# Patient Record
Sex: MALE | Race: WHITE | Employment: OTHER | URBAN - METROPOLITAN AREA
[De-identification: names, ages, dates, MRNs, and addresses within clinical notes are randomized per-mention and may not be internally consistent; named-entity substitution may affect disease eponyms.]

---

## 2019-12-19 ENCOUNTER — APPOINTMENT (OUTPATIENT)
Dept: GENERAL RADIOLOGY | Age: 79
End: 2019-12-19
Attending: STUDENT IN AN ORGANIZED HEALTH CARE EDUCATION/TRAINING PROGRAM
Payer: MEDICARE

## 2019-12-19 ENCOUNTER — HOSPITAL ENCOUNTER (OUTPATIENT)
Age: 79
Setting detail: OBSERVATION
Discharge: HOME | End: 2019-12-20
Attending: STUDENT IN AN ORGANIZED HEALTH CARE EDUCATION/TRAINING PROGRAM | Admitting: INTERNAL MEDICINE
Payer: MEDICARE

## 2019-12-19 DIAGNOSIS — R07.9 CHEST PAIN, UNSPECIFIED TYPE: Primary | ICD-10-CM

## 2019-12-19 LAB
ALBUMIN SERPL-MCNC: 3.5 G/DL (ref 3.2–4.6)
ALBUMIN/GLOB SERPL: 1.1 {RATIO} (ref 1.2–3.5)
ALP SERPL-CCNC: 85 U/L (ref 50–136)
ALT SERPL-CCNC: 23 U/L (ref 12–65)
ANION GAP SERPL CALC-SCNC: 5 MMOL/L (ref 7–16)
AST SERPL-CCNC: 29 U/L (ref 15–37)
ATRIAL RATE: 77 BPM
BASOPHILS # BLD: 0.1 K/UL (ref 0–0.2)
BASOPHILS NFR BLD: 1 % (ref 0–2)
BILIRUB SERPL-MCNC: 0.3 MG/DL (ref 0.2–1.1)
BUN SERPL-MCNC: 17 MG/DL (ref 8–23)
CALCIUM SERPL-MCNC: 8.4 MG/DL (ref 8.3–10.4)
CALCULATED P AXIS, ECG09: 94 DEGREES
CALCULATED R AXIS, ECG10: 12 DEGREES
CALCULATED T AXIS, ECG11: 57 DEGREES
CHLORIDE SERPL-SCNC: 111 MMOL/L (ref 98–107)
CO2 SERPL-SCNC: 30 MMOL/L (ref 21–32)
CREAT SERPL-MCNC: 1.26 MG/DL (ref 0.8–1.5)
DIAGNOSIS, 93000: NORMAL
DIFFERENTIAL METHOD BLD: ABNORMAL
EOSINOPHIL # BLD: 0.3 K/UL (ref 0–0.8)
EOSINOPHIL NFR BLD: 3 % (ref 0.5–7.8)
ERYTHROCYTE [DISTWIDTH] IN BLOOD BY AUTOMATED COUNT: 13.7 % (ref 11.9–14.6)
GLOBULIN SER CALC-MCNC: 3.2 G/DL (ref 2.3–3.5)
GLUCOSE SERPL-MCNC: 82 MG/DL (ref 65–100)
HCT VFR BLD AUTO: 44.2 % (ref 41.1–50.3)
HGB BLD-MCNC: 14.3 G/DL (ref 13.6–17.2)
IMM GRANULOCYTES # BLD AUTO: 0 K/UL (ref 0–0.5)
IMM GRANULOCYTES NFR BLD AUTO: 0 % (ref 0–5)
LIPASE SERPL-CCNC: 175 U/L (ref 73–393)
LYMPHOCYTES # BLD: 1.2 K/UL (ref 0.5–4.6)
LYMPHOCYTES NFR BLD: 15 % (ref 13–44)
MCH RBC QN AUTO: 29.1 PG (ref 26.1–32.9)
MCHC RBC AUTO-ENTMCNC: 32.4 G/DL (ref 31.4–35)
MCV RBC AUTO: 90 FL (ref 79.6–97.8)
MONOCYTES # BLD: 0.5 K/UL (ref 0.1–1.3)
MONOCYTES NFR BLD: 6 % (ref 4–12)
NEUTS SEG # BLD: 6.3 K/UL (ref 1.7–8.2)
NEUTS SEG NFR BLD: 75 % (ref 43–78)
NRBC # BLD: 0 K/UL (ref 0–0.2)
P-R INTERVAL, ECG05: 234 MS
PLATELET # BLD AUTO: 492 K/UL (ref 150–450)
PMV BLD AUTO: 9.7 FL (ref 9.4–12.3)
POTASSIUM SERPL-SCNC: 4 MMOL/L (ref 3.5–5.1)
PROT SERPL-MCNC: 6.7 G/DL (ref 6.3–8.2)
Q-T INTERVAL, ECG07: 354 MS
QRS DURATION, ECG06: 76 MS
QTC CALCULATION (BEZET), ECG08: 400 MS
RBC # BLD AUTO: 4.91 M/UL (ref 4.23–5.6)
SODIUM SERPL-SCNC: 146 MMOL/L (ref 136–145)
TROPONIN I SERPL-MCNC: <0.02 NG/ML (ref 0.02–0.05)
TROPONIN I SERPL-MCNC: <0.02 NG/ML (ref 0.02–0.05)
VENTRICULAR RATE, ECG03: 77 BPM
WBC # BLD AUTO: 8.5 K/UL (ref 4.3–11.1)

## 2019-12-19 PROCEDURE — 99218 HC RM OBSERVATION: CPT

## 2019-12-19 PROCEDURE — 71046 X-RAY EXAM CHEST 2 VIEWS: CPT

## 2019-12-19 PROCEDURE — 84484 ASSAY OF TROPONIN QUANT: CPT

## 2019-12-19 PROCEDURE — 85025 COMPLETE CBC W/AUTO DIFF WBC: CPT

## 2019-12-19 PROCEDURE — 99285 EMERGENCY DEPT VISIT HI MDM: CPT | Performed by: STUDENT IN AN ORGANIZED HEALTH CARE EDUCATION/TRAINING PROGRAM

## 2019-12-19 PROCEDURE — 93005 ELECTROCARDIOGRAM TRACING: CPT | Performed by: STUDENT IN AN ORGANIZED HEALTH CARE EDUCATION/TRAINING PROGRAM

## 2019-12-19 PROCEDURE — 83690 ASSAY OF LIPASE: CPT

## 2019-12-19 PROCEDURE — 36415 COLL VENOUS BLD VENIPUNCTURE: CPT

## 2019-12-19 PROCEDURE — 80053 COMPREHEN METABOLIC PANEL: CPT

## 2019-12-19 RX ORDER — MEMANTINE HYDROCHLORIDE 10 MG/1
10 TABLET ORAL DAILY
COMMUNITY
End: 2020-11-17

## 2019-12-19 RX ORDER — GUAIFENESIN 100 MG/5ML
324 LIQUID (ML) ORAL
Status: DISCONTINUED | OUTPATIENT
Start: 2019-12-19 | End: 2019-12-19

## 2019-12-19 NOTE — ED NOTES
Patient has returned from radiology at this time. Cardiac monitoring and cycling vitals in place. Family at bedside.

## 2019-12-19 NOTE — ED PROVIDER NOTES
70-year-old male patient presents with reports of sudden onset left-sided chest pain. Patient states pain started as walking his dog. Family at bedside estimates approximately 30 minutes of symptoms prior to spontaneous resolution prior to my evaluation. Patient reports some mild shortness of breath. He did report some nausea but denies vomiting. No diaphoresis mention. Patient denies any radiation of symptoms per report. History is limited secondary to patient's history of dementia. Currently, patient has experienced similar episodes though less severe intermittently over the past 2 weeks.            Past Medical History:   Diagnosis Date    Hematuria, microscopic     Memory loss     PUD (peptic ulcer disease)     no recent episodes       Past Surgical History:   Procedure Laterality Date    APPENDECTOMY      HX HERNIA REPAIR           Family History:   Problem Relation Age of Onset    Cancer Mother     Lung Disease Sister     Cancer Brother     Alcohol abuse Brother     Alcohol abuse Brother     Alcohol abuse Brother        Social History     Socioeconomic History    Marital status:      Spouse name: Not on file    Number of children: Not on file    Years of education: Not on file    Highest education level: Not on file   Occupational History    Not on file   Social Needs    Financial resource strain: Not on file    Food insecurity:     Worry: Not on file     Inability: Not on file    Transportation needs:     Medical: Not on file     Non-medical: Not on file   Tobacco Use    Smoking status: Never Smoker    Smokeless tobacco: Never Used   Substance and Sexual Activity    Alcohol use: Yes     Comment: no alcohol in 7 months    Drug use: Not on file    Sexual activity: Not on file   Lifestyle    Physical activity:     Days per week: Not on file     Minutes per session: Not on file    Stress: Not on file   Relationships    Social connections:     Talks on phone: Not on file Gets together: Not on file     Attends [employer] service: Not on file     Active member of club or organization: Not on file     Attends meetings of clubs or organizations: Not on file     Relationship status: Not on file    Intimate partner violence:     Fear of current or ex partner: Not on file     Emotionally abused: Not on file     Physically abused: Not on file     Forced sexual activity: Not on file   Other Topics Concern    Not on file   Social History Narrative    Not on file         ALLERGIES: Patient has no known allergies. Review of Systems   Constitutional: Negative for chills, diaphoresis and fever. HENT: Negative for congestion, sneezing and sore throat. Eyes: Negative for visual disturbance. Respiratory: Negative for cough, chest tightness, shortness of breath and wheezing. Cardiovascular: Positive for chest pain. Negative for leg swelling. Gastrointestinal: Negative for abdominal pain, blood in stool, diarrhea, nausea and vomiting. Endocrine: Negative for polyuria. Genitourinary: Negative for difficulty urinating, dysuria, flank pain, hematuria and urgency. Musculoskeletal: Negative for back pain, myalgias, neck pain and neck stiffness. Skin: Negative for color change and rash. Neurological: Negative for dizziness, syncope, speech difficulty, weakness, light-headedness, numbness and headaches. Psychiatric/Behavioral: Negative for behavioral problems. All other systems reviewed and are negative. Vitals:    12/19/19 1707   BP: 135/66   Pulse: 79   Resp: 16   Temp: 98.1 °F (36.7 °C)   SpO2: 96%   Weight: 61.2 kg (135 lb)   Height: 5' 7\" (1.702 m)            Physical Exam  Vitals signs and nursing note reviewed. Constitutional:       General: He is not in acute distress. Appearance: He is well-developed. He is not diaphoretic. Comments: Elderly appearing male patient, alert and oriented to person place and time.   No acute distress, speaks in clear, fluid sentences. HENT:      Head: Normocephalic and atraumatic. Right Ear: External ear normal.      Left Ear: External ear normal.      Nose: Nose normal.   Eyes:      Pupils: Pupils are equal, round, and reactive to light. Neck:      Musculoskeletal: Normal range of motion. Cardiovascular:      Rate and Rhythm: Normal rate and regular rhythm. Heart sounds: Normal heart sounds. No murmur. No friction rub. No gallop. Pulmonary:      Effort: Pulmonary effort is normal. No respiratory distress. Breath sounds: Normal breath sounds. No stridor. No decreased breath sounds, wheezing, rhonchi or rales. Chest:      Chest wall: No tenderness. Abdominal:      General: There is no distension. Palpations: Abdomen is soft. There is no mass. Tenderness: There is no tenderness. There is no guarding or rebound. Hernia: No hernia is present. Musculoskeletal: Normal range of motion. General: No tenderness or deformity. Skin:     General: Skin is warm and dry. Neurological:      Mental Status: He is alert and oriented to person, place, and time. Cranial Nerves: No cranial nerve deficit. MDM  Number of Diagnoses or Management Options  Diagnosis management comments: EKG obtained on arrival shows normal sinus rhythm with a rate of 77 beats a minute. No evidence of acute ischemic change.   Denies pain at present 5:24 PM         Amount and/or Complexity of Data Reviewed  Clinical lab tests: ordered and reviewed  Tests in the radiology section of CPT®: ordered and reviewed  Tests in the medicine section of CPT®: ordered and reviewed  Discuss the patient with other providers: yes  Independent visualization of images, tracings, or specimens: yes    Risk of Complications, Morbidity, and/or Mortality  Presenting problems: moderate  Diagnostic procedures: low  Management options: moderate    Patient Progress  Patient progress: stable         Procedures

## 2019-12-19 NOTE — ED TRIAGE NOTES
Patient arrives via [facility] from home. Patient reports sudden onset of chest pain while out walking. Pain midsternal, almost epigastric. Reported to family started in shoulder and radiated to abdomen. Patient took 324 asa and rested with relief of chest pain. Patient reports 2 episodes of vomiting.

## 2019-12-20 VITALS
WEIGHT: 124.9 LBS | TEMPERATURE: 97.2 F | HEART RATE: 75 BPM | BODY MASS INDEX: 19.6 KG/M2 | RESPIRATION RATE: 18 BRPM | DIASTOLIC BLOOD PRESSURE: 54 MMHG | OXYGEN SATURATION: 97 % | HEIGHT: 67 IN | SYSTOLIC BLOOD PRESSURE: 124 MMHG

## 2019-12-20 LAB
ATRIAL RATE: 51 BPM
CALCULATED P AXIS, ECG09: -8 DEGREES
CALCULATED R AXIS, ECG10: 68 DEGREES
CALCULATED T AXIS, ECG11: 92 DEGREES
DIAGNOSIS, 93000: NORMAL
P-R INTERVAL, ECG05: 180 MS
Q-T INTERVAL, ECG07: 404 MS
QRS DURATION, ECG06: 86 MS
QTC CALCULATION (BEZET), ECG08: 372 MS
VENTRICULAR RATE, ECG03: 51 BPM

## 2019-12-20 PROCEDURE — 93005 ELECTROCARDIOGRAM TRACING: CPT | Performed by: INTERNAL MEDICINE

## 2019-12-20 PROCEDURE — 90471 IMMUNIZATION ADMIN: CPT

## 2019-12-20 PROCEDURE — 74011250636 HC RX REV CODE- 250/636: Performed by: INTERNAL MEDICINE

## 2019-12-20 PROCEDURE — 99218 HC RM OBSERVATION: CPT

## 2019-12-20 PROCEDURE — 90686 IIV4 VACC NO PRSV 0.5 ML IM: CPT | Performed by: INTERNAL MEDICINE

## 2019-12-20 RX ADMIN — INFLUENZA VIRUS VACCINE 0.5 ML: 15; 15; 15; 15 SUSPENSION INTRAMUSCULAR at 10:28

## 2019-12-20 NOTE — DISCHARGE INSTRUCTIONS
Patient Education        Heart-Healthy Diet: Care Instructions  Your Care Instructions    A heart-healthy diet has lots of vegetables, fruits, nuts, beans, and whole grains, and is low in salt. It limits foods that are high in saturated fat, such as meats, cheeses, and fried foods. It may be hard to change your diet, but even small changes can lower your risk of heart attack and heart disease. Follow-up care is a key part of your treatment and safety. Be sure to make and go to all appointments, and call your doctor if you are having problems. It's also a good idea to know your test results and keep a list of the medicines you take. How can you care for yourself at home? Watch your portions  · Learn what a serving is. A \"serving\" and a \"portion\" are not always the same thing. Make sure that you are not eating larger portions than are recommended. For example, a serving of pasta is ½ cup. A serving size of meat is 2 to 3 ounces. A 3-ounce serving is about the size of a deck of cards. Measure serving sizes until you are good at [place]" them. Keep in mind that restaurants often serve portions that are 2 or 3 times the size of one serving. · To keep your energy level up and keep you from feeling hungry, eat often but in smaller portions. · Eat only the number of calories you need to stay at a healthy weight. If you need to lose weight, eat fewer calories than your body burns (through exercise and other physical activity). Eat more fruits and vegetables  · Eat a variety of fruit and vegetables every day. Dark green, deep orange, red, or yellow fruits and vegetables are especially good for you. Examples include spinach, carrots, peaches, and berries. · Keep carrots, celery, and other veggies handy for snacks. Buy fruit that is in season and store it where you can see it so that you will be tempted to eat it. · Cook dishes that have a lot of veggies in them, such as stir-fries and soups.   Limit saturated and trans fat  · Read food labels, and try to avoid saturated and trans fats. They increase your risk of heart disease. Trans fat is found in many processed foods such as cookies and crackers. · Use olive or canola oil when you cook. Try cholesterol-lowering spreads, such as Benecol or Take Control. · Bake, broil, grill, or steam foods instead of frying them. · Choose lean meats instead of high-fat meats such as hot dogs and sausages. Cut off all visible fat when you prepare meat. · Eat fish, skinless poultry, and meat alternatives such as soy products instead of high-fat meats. Soy products, such as tofu, may be especially good for your heart. · Choose low-fat or fat-free milk and dairy products. Eat fish  · Eat at least two servings of fish a week. Certain fish, such as salmon and tuna, contain omega-3 fatty acids, which may help reduce your risk of heart attack. Eat foods high in fiber  · Eat a variety of grain products every day. Include whole-grain foods that have lots of fiber and nutrients. Examples of whole-grain foods include oats, whole wheat bread, and brown rice. · Buy whole-grain breads and cereals, instead of white bread or pastries. Limit salt and sodium  · Limit how much salt and sodium you eat to help lower your blood pressure. · Taste food before you salt it. Add only a little salt when you think you need it. With time, your taste buds will adjust to less salt. · Eat fewer snack items, fast foods, and other high-salt, processed foods. Check food labels for the amount of sodium in packaged foods. · Choose low-sodium versions of canned goods (such as soups, vegetables, and beans). Limit sugar  · Limit drinks and foods with added sugar. These include candy, desserts, and soda pop. Limit alcohol  · Limit alcohol to no more than 2 drinks a day for men and 1 drink a day for women. Too much alcohol can cause health problems. When should you call for help?   Watch closely for changes in your health, and be sure to contact your doctor if:    · You would like help planning heart-healthy meals. Where can you learn more? Go to http://waldemar-daljit.info/. Enter V137 in the search box to learn more about \"Heart-Healthy Diet: Care Instructions. \"  Current as of: April 9, 2019  Content Version: 12.2  © 5695-4742 [employer]. Care instructions adapted under license by [employer] (which disclaims liability or warranty for this information). If you have questions about a medical condition or this instruction, always ask your healthcare professional. [place] [address] any warranty or liability for your use of this information.

## 2019-12-20 NOTE — PROGRESS NOTES
Chart screened by  for discharge planning. No needs identified at this time. Please consult  if any new issues arise.     Care Management Interventions  Transition of Care Consult (CM Consult): Discharge Planning  MyChart Signup: No  Discharge Durable Medical Equipment: No  Physical Therapy Consult: No  Occupational Therapy Consult: No  Speech Therapy Consult: No  Discharge Location  Discharge Placement: Home

## 2019-12-20 NOTE — ED NOTES
TRANSFER - OUT REPORT:    Verbal report given to Russell Angeles RN (name) on Alberto Echols  being transferred to 330(unit) for routine progression of care       Report consisted of patients Situation, Background, Assessment and   Recommendations(SBAR). Information from the following report(s) SBAR, ED Summary, [facility] and Recent Results was reviewed with the receiving nurse. Lines:   Peripheral IV 12/19/19 Left Wrist (Active)   Site Assessment Clean, dry, & intact 12/19/2019  5:08 PM   Phlebitis Assessment 0 12/19/2019  5:08 PM   Infiltration Assessment 0 12/19/2019  5:08 PM   Dressing Status Clean, dry, & intact 12/19/2019  5:08 PM   Dressing Type 4 X 4;Transparent 12/19/2019  5:08 PM   Hub Color/Line Status Green 12/19/2019  5:08 PM        Opportunity for questions and clarification was provided.       Patient transported with:   Registered Nurse

## 2019-12-20 NOTE — H&P
[address] [place] [address]  HISTORY AND PHYSICAL    Name:  Edenilson Matthew  MR#:  [id number]  :  1940  ACCOUNT #:  [de-identified]  ADMIT DATE:  2019    HISTORY:  This is a 75-year-old gentleman who we are asked to observe in the hospital by the emergency room physician for chest pain. He had an episode of chest discomfort and abdominal discomfort this afternoon. It could not be further characterized because the patient has dementia. Apparently at his independent living facility, he did not feel well. He did call one of his daughters who came to see him. He was transported to the emergency room. He has had no further chest discomfort. He has no known prior heart history. His past medical history is remarkable for dementia. He lives independently in a supportive community. This episode of chest discomfort occurred while walking a dog. PAST MEDICAL HISTORY:  Otherwise remarkable for a lower esophageal stricture and evidence of aspiration. MEDICATIONS:  At home are listed as [place]. FAMILY HISTORY:  Noncontributory. SOCIAL HISTORY:  Otherwise negative. ALLERGIES:  THERE ARE NO KNOWN ALLERGIES. REVIEW OF SYSTEMS:  Is performed. It is limited due to his dementia. He denies any fever or chills, diplopia, tinnitus, epistaxis, dysphagia, abdominal pain or melena, or signs and symptoms of stroke. PHYSICAL EXAMINATION:  VITAL SIGNS:  His blood pressure is 102/73, pulse is 67. GENERAL:  This is a well-developed, well-nourished pleasant gentleman in no distress. HEENT:  Grossly negative. There is no jaundice or drainage. NECK:  His neck veins are flat. Carotids are negative. Thyroid is not enlarged. LUNGS:  Clear. HEART:  Reveals a regular rate and rhythm. There is no murmur, gallop or rub. ABDOMEN:  Soft. There are no masses. No tenderness. EXTREMITIES:  Pulses are intact. There is no edema. NEUROLOGICAL:  Remarkable for his dementia.   He cannot remember any of the events from today. His electrocardiogram shows a normal sinus rhythm. 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 His chest x-ray suggests COPD. His lab work is negative except for creatinine 1.2. His initial troponin is less than 0.02. IMPRESSION:  1. Status post episode of chest and abdominal pain that is unable to be further characterized due to the patient's dementia. 2.  Dementia. 3.  Esophageal stricture. 4.  History of aspiration. 5.  Chronic obstructive pulmonary disease on chest x-ray. 6.  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 PLAN:  He will be observed overnight. An additional troponin will be ordered. No further testing is anticipated. It is hoped for an early discharge for him to be able to return back to his usual situation in his independent living home.       Gissel Corona MD      [id number]  D:  12/19/2019 21:29  T:  12/19/2019 23:50  JOB #:  [id number]

## 2019-12-20 NOTE — H&P
Pt seen and examined. Imp:  S/p episode of poorly characterized chest +/or abdominal pain  Dementia  Abnormal Ekg  Hx esophogeal stricture  Hx aspiration  Plan:  Observe overnight. If second troponin negative, discharge early.

## 2019-12-20 NOTE — PROGRESS NOTES
Patient arrived to the floor via stretcher to room 330 . Patient oriented to the room and use of the call light. Assessment completed. Telemetry box placed and verified with monitor tech. Call light within reach. Dual admission skin assessment is as follows: skin is warm, dry, and intact. Heels dry and intact. No skin breakdown noted. Sacrum intact and blanchable.

## 2019-12-20 NOTE — MED STUDENT NOTES
12/20/2019 8:08 AM 
 
Admit Date: 12/19/2019 Admit Diagnosis: Chest pain [R07.9] Subjective:  
 Patient is 77 yo male with a history of chest pain, esophageal stricture, htn, alzheimers. He was admitted from the ER with chest pain for observation. Troponins returned normal.  
 
12/20 - no chest pain or sob. Objective:  
  
Visit Vitals /79 (BP 1 Location: Right arm, BP Patient Position: At rest) Pulse 63 Temp 98.3 °F (36.8 °C) Resp 18 Ht 5' 7\" (1.702 m) Wt 124 lb 14.4 oz (56.7 kg) SpO2 97% BMI 19.56 kg/m² ROS:  General ROS: negative for - chills Hematological and Lymphatic ROS: negative for - blood clots or jaundice Respiratory ROS: no cough, shortness of breath, or wheezing Cardiovascular ROS: no chest pain or dyspnea on exertion Gastrointestinal ROS: no abdominal pain, change in bowel habits, or black or bloody stools Neurological ROS: no TIA or stroke symptoms Physical Exam: 
 
Physical Examination: General appearance - alert, well appearing, and in no distress Mental status - alert, oriented to person, place, and time Eyes - pupils equal and reactive, extraocular eye movements intact Neck/lymph - supple, no significant adenopathy Chest/CV - clear to auscultation, no wheezes, rales or rhonchi, symmetric air entry Heart - normal rate, regular rhythm, normal S1, S2, no murmurs, rubs, clicks or gallops Abdomen/GI - soft, nontender, nondistended, no masses or organomegaly Musculoskeletal - no joint tenderness, deformity or swelling Extremities - peripheral pulses normal, no pedal edema, no clubbing or cyanosis Skin - normal coloration and turgor, no rashes, no suspicious skin lesions noted Current Facility-Administered Medications Medication Dose Route Frequency  influenza vaccine 2019-20 (6 mos+)(PF) (FLUARIX/FLULAVAL/FLUZONE QUAD) injection 0.5 mL  0.5 mL IntraMUSCular PRIOR TO DISCHARGE Data Review: @LABRCNT(Na,K,BUN,CREA,WBC,HGB,HCT,PLT,INR,TRP,TCHOL*,Triglyceride*,LDL*,LDLCPOC HDL*,HDL])@ TELEMETRY: observation Assessment/Plan: Active Problems: 
  Chest pain (12/19/2019) - Troponins normal, symptoms resolved - Discharge home today Padmaja Florez

## 2019-12-20 NOTE — PROGRESS NOTES
Problem: Falls - Risk of  Goal: *Absence of Falls  Description  Document [place] Fall Risk and appropriate interventions in the flowsheet.   Outcome: Progressing Towards Goal  Note: Fall Risk Interventions:       Mentation Interventions: Bed/chair exit alarm, Door open when patient unattended, Increase mobility, More frequent rounding, Reorient patient    Medication Interventions: Bed/chair exit alarm, Evaluate medications/consider consulting pharmacy, Patient to call before getting OOB, Teach patient to arise slowly

## 2019-12-20 NOTE — PROGRESS NOTES
Discharge instructions were reviewed with patient. An opportunity was given for questions. All medications were reviewed, and information was given on the new medications. Patient verbalized understanding, and has no questions at this time.    Iv and heart monitor removed

## 2019-12-20 NOTE — HOME CARE
[facility] Cardiology Discharge Summary     Patient ID:  Catina Hall  [id number]  70 y.o.  1940    Admit date: 12/19/2019    Discharge date:  12/20/2019    Admitting Physician: Radha Brooks MD     Discharge Physician: Baron Jacobson NP/Dr. GI [facility]ERE[facility]    Admission Diagnoses: Chest pain [R07.9]    Discharge Diagnoses:   Patient Active Problem List    Diagnosis Date Noted    Chest pain 12/19/2019       Cardiology Procedures this admission:  None  Consults: None    Hospital Course: Patient presented to the ER with c/o chest pain and abdominal pain while walking his dog. Description of the pain was difficult to obtain due to the patient's dementia. Troponin level was negative x 2. EKG showed SR with no acute ST changes. No further chest pain overnight. The patient will follow up with [facility] Cardiology -- Dr. Garfield Arteaga on 1/6/19 @ 8 am to discuss if further risk stratification is warranted. .     DISPOSITION: The patient is being discharged home in stable condition on a low saturated fat, low cholesterol and low salt diet. The patient is instructed to advance activities as tolerated to the limit of fatigue or shortness of breath. . The patient is instructed to call the office or return to the ER for immediate evaluation for any shortness of breath or chest pain not relieved by NTG.         Discharge Exam:   Visit Vitals  /54 (BP 1 Location: Left arm, BP Patient Position: At rest)   Pulse 75   Temp 97.2 °F (36.2 °C)   Resp 18   Ht 5' 7\" (1.702 m)   Wt 124 lb 14.4 oz (56.7 kg)   SpO2 97%   BMI 19.56 kg/m²         Recent Results (from the past 24 hour(s))   EKG, 12 LEAD, INITIAL    Collection Time: 12/19/19  5:09 PM   Result Value Ref Range    Ventricular Rate 77 BPM    Atrial Rate 77 BPM    P-R Interval 234 ms    QRS Duration 76 ms    Q-T Interval 354 ms    QTC Calculation (Bezet) 400 ms    Calculated P Axis 94 degrees    Calculated R Axis 12 degrees    Calculated T Axis 57 degrees Diagnosis       !! AGE AND GENDER SPECIFIC ECG ANALYSIS !! Sinus rhythm with 1st degree A-V block  Inferior infarct , age undetermined  Abnormal ECG  No previous ECGs available  Confirmed by JOSE R BECKFORD (), Ines Potter ([id number]) on 12/19/2019 5:36:14 PM     CBC WITH AUTOMATED DIFF    Collection Time: 12/19/19  5:18 PM   Result Value Ref Range    WBC 8.5 4.3 - 11.1 K/uL    RBC 4.91 4.23 - 5.6 M/uL    HGB 14.3 13.6 - 17.2 g/dL    HCT 44.2 41.1 - 50.3 %    MCV 90.0 79.6 - 97.8 FL    MCH 29.1 26.1 - 32.9 PG    MCHC 32.4 31.4 - 35.0 g/dL    RDW 13.7 11.9 - 14.6 %    PLATELET 241 (H) 959 - 450 K/uL    MPV 9.7 9.4 - 12.3 FL    ABSOLUTE NRBC 0.00 0.0 - 0.2 K/uL    DF AUTOMATED      NEUTROPHILS 75 43 - 78 %    LYMPHOCYTES 15 13 - 44 %    MONOCYTES 6 4.0 - 12.0 %    EOSINOPHILS 3 0.5 - 7.8 %    BASOPHILS 1 0.0 - 2.0 %    IMMATURE GRANULOCYTES 0 0.0 - 5.0 %    ABS. NEUTROPHILS 6.3 1.7 - 8.2 K/UL    ABS. LYMPHOCYTES 1.2 0.5 - 4.6 K/UL    ABS. MONOCYTES 0.5 0.1 - 1.3 K/UL    ABS. EOSINOPHILS 0.3 0.0 - 0.8 K/UL    ABS. BASOPHILS 0.1 0.0 - 0.2 K/UL    ABS. IMM. GRANS. 0.0 0.0 - 0.5 K/UL   METABOLIC PANEL, COMPREHENSIVE    Collection Time: 12/19/19  5:18 PM   Result Value Ref Range    Sodium 146 (H) 136 - 145 mmol/L    Potassium 4.0 3.5 - 5.1 mmol/L    Chloride 111 (H) 98 - 107 mmol/L    CO2 30 21 - 32 mmol/L    Anion gap 5 (L) 7 - 16 mmol/L    Glucose 82 65 - 100 mg/dL    BUN 17 8 - 23 MG/DL    Creatinine 1.26 0.8 - 1.5 MG/DL    GFR est AA >60 >60 ml/min/1.73m2    GFR est non-AA 59 (L) >60 ml/min/1.73m2    Calcium 8.4 8.3 - 10.4 MG/DL    Bilirubin, total 0.3 0.2 - 1.1 MG/DL    ALT (SGPT) 23 12 - 65 U/L    AST (SGOT) 29 15 - 37 U/L    Alk.  phosphatase 85 50 - 136 U/L    Protein, total 6.7 6.3 - 8.2 g/dL    Albumin 3.5 3.2 - 4.6 g/dL    Globulin 3.2 2.3 - 3.5 g/dL    A-G Ratio 1.1 (L) 1.2 - 3.5     TROPONIN I    Collection Time: 12/19/19  5:18 PM   Result Value Ref Range    Troponin-I, Qt. <0.02 (L) 0.02 - 0.05 NG/ML   LIPASE Collection Time: 12/19/19  5:18 PM   Result Value Ref Range    Lipase 175 73 - 393 U/L   TROPONIN I    Collection Time: 12/19/19 10:12 PM   Result Value Ref Range    Troponin-I, Qt. <0.02 (L) 0.02 - 0.05 NG/ML   EKG, 12 LEAD, SUBSEQUENT    Collection Time: 12/20/19  6:39 AM   Result Value Ref Range    Ventricular Rate 51 BPM    Atrial Rate 51 BPM    P-R Interval 180 ms    QRS Duration 86 ms    Q-T Interval 404 ms    QTC Calculation (Bezet) 372 ms    Calculated P Axis -8 degrees    Calculated R Axis 68 degrees    Calculated T Axis 92 degrees    Diagnosis       Sinus bradycardia  Nonspecific ST abnormality  Abnormal ECG  When compared with ECG of 19-DEC-2019 17:09,  LA interval has decreased  Vent. rate has decreased BY  26 BPM  Criteria for Inferior infarct are no longer Present  T wave inversion now evident in Anterior leads           Patient Instructions:     Current Discharge Medication List      CONTINUE these medications which have NOT CHANGED    Details   memantine (NAMENDA) 10 mg tablet Take 10 mg by mouth daily.              Signed:  Julian Westbrook NP  12/20/2019  9:20 AM

## 2020-11-16 ENCOUNTER — APPOINTMENT (OUTPATIENT)
Dept: GENERAL RADIOLOGY | Age: 80
DRG: 177 | End: 2020-11-16
Attending: EMERGENCY MEDICINE
Payer: MEDICARE

## 2020-11-16 ENCOUNTER — HOSPITAL ENCOUNTER (INPATIENT)
Age: 80
LOS: 4 days | Discharge: HOME | DRG: 177 | End: 2020-11-21
Attending: EMERGENCY MEDICINE | Admitting: INTERNAL MEDICINE
Payer: MEDICARE

## 2020-11-16 DIAGNOSIS — U07.1 PNEUMONIA DUE TO 2019 NOVEL CORONAVIRUS: Primary | ICD-10-CM

## 2020-11-16 DIAGNOSIS — J12.82 PNEUMONIA DUE TO 2019 NOVEL CORONAVIRUS: Primary | ICD-10-CM

## 2020-11-16 DIAGNOSIS — R09.02 HYPOXEMIA: ICD-10-CM

## 2020-11-16 LAB
ALBUMIN SERPL-MCNC: 2.8 G/DL (ref 3.2–4.6)
ALBUMIN/GLOB SERPL: 0.8 {RATIO} (ref 1.2–3.5)
ALP SERPL-CCNC: 53 U/L (ref 50–136)
ALT SERPL-CCNC: 7 U/L (ref 12–65)
ANION GAP SERPL CALC-SCNC: 9 MMOL/L (ref 7–16)
AST SERPL-CCNC: 31 U/L (ref 15–37)
ATRIAL RATE: 71 BPM
BASOPHILS # BLD: 0 K/UL (ref 0–0.2)
BASOPHILS NFR BLD: 0 % (ref 0–2)
BILIRUB SERPL-MCNC: 0.4 MG/DL (ref 0.2–1.1)
BUN SERPL-MCNC: 21 MG/DL (ref 8–23)
CALCIUM SERPL-MCNC: 7.5 MG/DL (ref 8.3–10.4)
CALCULATED R AXIS, ECG10: 54 DEGREES
CALCULATED T AXIS, ECG11: 72 DEGREES
CHLORIDE SERPL-SCNC: 110 MMOL/L (ref 98–107)
CO2 SERPL-SCNC: 24 MMOL/L (ref 21–32)
COVID-19 RAPID TEST, COVR: DETECTED
CREAT SERPL-MCNC: 1.25 MG/DL (ref 0.8–1.5)
DIAGNOSIS, 93000: NORMAL
DIFFERENTIAL METHOD BLD: ABNORMAL
EOSINOPHIL # BLD: 0 K/UL (ref 0–0.8)
EOSINOPHIL NFR BLD: 0 % (ref 0.5–7.8)
ERYTHROCYTE [DISTWIDTH] IN BLOOD BY AUTOMATED COUNT: 14 % (ref 11.9–14.6)
GLOBULIN SER CALC-MCNC: 3.5 G/DL (ref 2.3–3.5)
GLUCOSE SERPL-MCNC: 99 MG/DL (ref 65–100)
HCT VFR BLD AUTO: 40.8 % (ref 41.1–50.3)
HGB BLD-MCNC: 13 G/DL (ref 13.6–17.2)
IMM GRANULOCYTES # BLD AUTO: 0 K/UL (ref 0–0.5)
IMM GRANULOCYTES NFR BLD AUTO: 0 % (ref 0–5)
LACTATE SERPL-SCNC: 1 MMOL/L (ref 0.4–2)
LYMPHOCYTES # BLD: 1 K/UL (ref 0.5–4.6)
LYMPHOCYTES NFR BLD: 14 % (ref 13–44)
MCH RBC QN AUTO: 28.2 PG (ref 26.1–32.9)
MCHC RBC AUTO-ENTMCNC: 31.9 G/DL (ref 31.4–35)
MCV RBC AUTO: 88.5 FL (ref 79.6–97.8)
MONOCYTES # BLD: 0.5 K/UL (ref 0.1–1.3)
MONOCYTES NFR BLD: 7 % (ref 4–12)
NEUTS SEG # BLD: 5.6 K/UL (ref 1.7–8.2)
NEUTS SEG NFR BLD: 78 % (ref 43–78)
NRBC # BLD: 0 K/UL (ref 0–0.2)
PLATELET # BLD AUTO: 438 K/UL (ref 150–450)
PMV BLD AUTO: 9.8 FL (ref 9.4–12.3)
POTASSIUM SERPL-SCNC: 4.3 MMOL/L (ref 3.5–5.1)
PROT SERPL-MCNC: 6.3 G/DL (ref 6.3–8.2)
Q-T INTERVAL, ECG07: 374 MS
QRS DURATION, ECG06: 76 MS
QTC CALCULATION (BEZET), ECG08: 406 MS
RBC # BLD AUTO: 4.61 M/UL (ref 4.23–5.6)
SODIUM SERPL-SCNC: 143 MMOL/L (ref 136–145)
SOURCE, COVRS: ABNORMAL
TROPONIN-HIGH SENSITIVITY: 15.8 PG/ML (ref 0–14)
VENTRICULAR RATE, ECG03: 71 BPM
WBC # BLD AUTO: 7.2 K/UL (ref 4.3–11.1)

## 2020-11-16 PROCEDURE — 83605 ASSAY OF LACTIC ACID: CPT

## 2020-11-16 PROCEDURE — 84145 PROCALCITONIN (PCT): CPT

## 2020-11-16 PROCEDURE — 93005 ELECTROCARDIOGRAM TRACING: CPT | Performed by: EMERGENCY MEDICINE

## 2020-11-16 PROCEDURE — 85025 COMPLETE CBC W/AUTO DIFF WBC: CPT

## 2020-11-16 PROCEDURE — 87040 BLOOD CULTURE FOR BACTERIA: CPT

## 2020-11-16 PROCEDURE — 71045 X-RAY EXAM CHEST 1 VIEW: CPT

## 2020-11-16 PROCEDURE — 96374 THER/PROPH/DIAG INJ IV PUSH: CPT

## 2020-11-16 PROCEDURE — 84484 ASSAY OF TROPONIN QUANT: CPT

## 2020-11-16 PROCEDURE — 87635 SARS-COV-2 COVID-19 AMP PRB: CPT

## 2020-11-16 PROCEDURE — 80053 COMPREHEN METABOLIC PANEL: CPT

## 2020-11-16 PROCEDURE — 99285 EMERGENCY DEPT VISIT HI MDM: CPT

## 2020-11-16 PROCEDURE — 74011250636 HC RX REV CODE- 250/636: Performed by: EMERGENCY MEDICINE

## 2020-11-16 RX ADMIN — SODIUM CHLORIDE 1000 ML: 900 INJECTION, SOLUTION INTRAVENOUS at 21:58

## 2020-11-16 RX ADMIN — AZITHROMYCIN MONOHYDRATE 500 MG: 500 INJECTION, POWDER, LYOPHILIZED, FOR SOLUTION INTRAVENOUS at 23:02

## 2020-11-17 PROBLEM — J96.01 ACUTE RESPIRATORY FAILURE WITH HYPOXIA (HCC): Status: ACTIVE | Noted: 2020-11-17

## 2020-11-17 PROBLEM — J12.82 PNEUMONIA DUE TO COVID-19 VIRUS: Status: ACTIVE | Noted: 2020-11-17

## 2020-11-17 PROBLEM — F02.80 ALZHEIMER DISEASE (HCC): Status: ACTIVE | Noted: 2020-11-17

## 2020-11-17 PROBLEM — U07.1 PNEUMONIA DUE TO COVID-19 VIRUS: Status: ACTIVE | Noted: 2020-11-17

## 2020-11-17 PROBLEM — G30.9 ALZHEIMER DISEASE (HCC): Status: ACTIVE | Noted: 2020-11-17

## 2020-11-17 LAB
ABO + RH BLD: NORMAL
BLOOD GROUP ANTIBODIES SERPL: NORMAL
D DIMER PPP FEU-MCNC: 0.55 UG/ML(FEU)
PROCALCITONIN SERPL-MCNC: 0.11 NG/ML
SPECIMEN EXP DATE BLD: NORMAL
TROPONIN-HIGH SENSITIVITY: 14.1 PG/ML (ref 0–14)

## 2020-11-17 PROCEDURE — 74011250637 HC RX REV CODE- 250/637: Performed by: INTERNAL MEDICINE

## 2020-11-17 PROCEDURE — 74011250636 HC RX REV CODE- 250/636: Performed by: INTERNAL MEDICINE

## 2020-11-17 PROCEDURE — 74011000258 HC RX REV CODE- 258: Performed by: INTERNAL MEDICINE

## 2020-11-17 PROCEDURE — XW033E5 INTRODUCTION OF REMDESIVIR ANTI-INFECTIVE INTO PERIPHERAL VEIN, PERCUTANEOUS APPROACH, NEW TECHNOLOGY GROUP 5: ICD-10-PCS | Performed by: INTERNAL MEDICINE

## 2020-11-17 PROCEDURE — 65270000029 HC RM PRIVATE

## 2020-11-17 PROCEDURE — 94760 N-INVAS EAR/PLS OXIMETRY 1: CPT

## 2020-11-17 PROCEDURE — 74011250636 HC RX REV CODE- 250/636: Performed by: EMERGENCY MEDICINE

## 2020-11-17 PROCEDURE — 77010033678 HC OXYGEN DAILY

## 2020-11-17 PROCEDURE — 86900 BLOOD TYPING SEROLOGIC ABO: CPT

## 2020-11-17 PROCEDURE — 84484 ASSAY OF TROPONIN QUANT: CPT

## 2020-11-17 PROCEDURE — 74011000250 HC RX REV CODE- 250: Performed by: INTERNAL MEDICINE

## 2020-11-17 PROCEDURE — 85379 FIBRIN DEGRADATION QUANT: CPT

## 2020-11-17 RX ORDER — PANTOPRAZOLE SODIUM 40 MG/1
40 TABLET, DELAYED RELEASE ORAL
Status: DISCONTINUED | OUTPATIENT
Start: 2020-11-17 | End: 2020-11-21 | Stop reason: HOSPADM

## 2020-11-17 RX ORDER — ACETAMINOPHEN 325 MG/1
650 TABLET ORAL
Status: DISCONTINUED | OUTPATIENT
Start: 2020-11-17 | End: 2020-11-21 | Stop reason: HOSPADM

## 2020-11-17 RX ORDER — SODIUM CHLORIDE 9 MG/ML
250 INJECTION, SOLUTION INTRAVENOUS AS NEEDED
Status: DISCONTINUED | OUTPATIENT
Start: 2020-11-17 | End: 2020-11-21 | Stop reason: HOSPADM

## 2020-11-17 RX ORDER — DEXAMETHASONE 4 MG/1
6 TABLET ORAL DAILY
Status: DISCONTINUED | OUTPATIENT
Start: 2020-11-17 | End: 2020-11-17

## 2020-11-17 RX ORDER — MEMANTINE HYDROCHLORIDE 5 MG/1
10 TABLET ORAL
Status: DISCONTINUED | OUTPATIENT
Start: 2020-11-17 | End: 2020-11-21 | Stop reason: HOSPADM

## 2020-11-17 RX ORDER — ASCORBIC ACID 500 MG
1000 TABLET ORAL 2 TIMES DAILY
Status: DISCONTINUED | OUTPATIENT
Start: 2020-11-17 | End: 2020-11-21 | Stop reason: HOSPADM

## 2020-11-17 RX ORDER — UREA 10 %
220 LOTION (ML) TOPICAL DAILY
Status: DISCONTINUED | OUTPATIENT
Start: 2020-11-17 | End: 2020-11-21 | Stop reason: HOSPADM

## 2020-11-17 RX ORDER — DONEPEZIL HYDROCHLORIDE 5 MG/1
5 TABLET, FILM COATED ORAL
Status: DISCONTINUED | OUTPATIENT
Start: 2020-11-17 | End: 2020-11-21 | Stop reason: HOSPADM

## 2020-11-17 RX ORDER — ONDANSETRON 2 MG/ML
4 INJECTION INTRAMUSCULAR; INTRAVENOUS
Status: DISCONTINUED | OUTPATIENT
Start: 2020-11-17 | End: 2020-11-21 | Stop reason: HOSPADM

## 2020-11-17 RX ORDER — MEMANTINE HYDROCHLORIDE 10 MG/1
10 TABLET ORAL
COMMUNITY
Start: 2020-10-30

## 2020-11-17 RX ORDER — PROMETHAZINE HYDROCHLORIDE 25 MG/1
12.5 TABLET ORAL
Status: DISCONTINUED | OUTPATIENT
Start: 2020-11-17 | End: 2020-11-21 | Stop reason: HOSPADM

## 2020-11-17 RX ORDER — POLYETHYLENE GLYCOL 3350 17 G/17G
17 POWDER, FOR SOLUTION ORAL DAILY PRN
Status: DISCONTINUED | OUTPATIENT
Start: 2020-11-17 | End: 2020-11-21 | Stop reason: HOSPADM

## 2020-11-17 RX ORDER — SODIUM CHLORIDE 0.9 % (FLUSH) 0.9 %
5-40 SYRINGE (ML) INJECTION EVERY 8 HOURS
Status: DISCONTINUED | OUTPATIENT
Start: 2020-11-17 | End: 2020-11-21 | Stop reason: HOSPADM

## 2020-11-17 RX ORDER — DONEPEZIL HYDROCHLORIDE 5 MG/1
5 TABLET, FILM COATED ORAL DAILY
COMMUNITY
Start: 2020-09-08 | End: 2020-11-17

## 2020-11-17 RX ORDER — ACETAMINOPHEN 650 MG/1
650 SUPPOSITORY RECTAL
Status: DISCONTINUED | OUTPATIENT
Start: 2020-11-17 | End: 2020-11-21 | Stop reason: HOSPADM

## 2020-11-17 RX ORDER — DEXAMETHASONE 4 MG/1
6 TABLET ORAL DAILY
Status: DISCONTINUED | OUTPATIENT
Start: 2020-11-17 | End: 2020-11-21 | Stop reason: HOSPADM

## 2020-11-17 RX ORDER — ENOXAPARIN SODIUM 100 MG/ML
30 INJECTION SUBCUTANEOUS EVERY 12 HOURS
Status: DISCONTINUED | OUTPATIENT
Start: 2020-11-17 | End: 2020-11-21 | Stop reason: HOSPADM

## 2020-11-17 RX ORDER — SODIUM CHLORIDE 0.9 % (FLUSH) 0.9 %
5-40 SYRINGE (ML) INJECTION AS NEEDED
Status: DISCONTINUED | OUTPATIENT
Start: 2020-11-17 | End: 2020-11-21 | Stop reason: HOSPADM

## 2020-11-17 RX ADMIN — Medication 10 ML: at 22:00

## 2020-11-17 RX ADMIN — Medication 5 ML: at 14:00

## 2020-11-17 RX ADMIN — ENOXAPARIN SODIUM 30 MG: 40 INJECTION SUBCUTANEOUS at 08:48

## 2020-11-17 RX ADMIN — OXYCODONE HYDROCHLORIDE AND ACETAMINOPHEN 1000 MG: 500 TABLET ORAL at 17:31

## 2020-11-17 RX ADMIN — SODIUM CHLORIDE 500 ML: 900 INJECTION, SOLUTION INTRAVENOUS at 00:51

## 2020-11-17 RX ADMIN — DONEPEZIL HYDROCHLORIDE 5 MG: 5 TABLET, FILM COATED ORAL at 21:01

## 2020-11-17 RX ADMIN — Medication 220 MG: at 08:48

## 2020-11-17 RX ADMIN — MEMANTINE HYDROCHLORIDE 10 MG: 5 TABLET ORAL at 21:01

## 2020-11-17 RX ADMIN — ENOXAPARIN SODIUM 30 MG: 40 INJECTION SUBCUTANEOUS at 21:01

## 2020-11-17 RX ADMIN — REMDESIVIR 200 MG: 100 INJECTION, POWDER, LYOPHILIZED, FOR SOLUTION INTRAVENOUS at 11:56

## 2020-11-17 RX ADMIN — OXYCODONE HYDROCHLORIDE AND ACETAMINOPHEN 1000 MG: 500 TABLET ORAL at 08:48

## 2020-11-17 RX ADMIN — PANTOPRAZOLE SODIUM 40 MG: 40 TABLET, DELAYED RELEASE ORAL at 08:48

## 2020-11-17 RX ADMIN — Medication 5 ML: at 05:19

## 2020-11-17 RX ADMIN — DEXAMETHASONE 6 MG: 4 TABLET ORAL at 05:18

## 2020-11-17 NOTE — PROGRESS NOTES
11/17/20 0310   Dual Skin Pressure Injury Assessment   Dual Skin Pressure Injury Assessment WDL   Second Care Provider (Based on [address] [place] [address]) POORNIMA Muhammad   Skin Integumentary   Skin Integumentary (WDL) WDL    Pressure  Injury Documentation No Pressure Injury Noted-Pressure Ulcer Prevention Initiated

## 2020-11-17 NOTE — H&P
Hospitalist H&P Note     Admit Date:  2020  8:58 PM   Name:  Driss Shaw  Age: [de-identified] y.o.  : 1940   MRN:  [id number]   PCP:  Eugenio Dubois MD    HPI/Subjective:   Driss Shaw is a [de-identified] y.o. male with medical history significant for Alzheimer's dementia who presented from home with fever since Saturday, decreased appetite, increased confusion and hypoxia today. And is alert and oriented x3 but very hard of hearing. Provided by his daughter who was at bedside. Patient lives independently in his own apt and walks daily without any issues. He was found to have some confusions over the weekend and noted to have febrile episodes with Tmax of 102.7F. He was also noted to have decreased PO intake. Patient was taken to the Emergency MD today and to be hypotensive with blood pressure 92/50 and saturation low 90s. ED for further work-up and management. CXR at Emergency MD showed bilateral infiltrate suspicious for covid19 infection. In the ED, patient was noted to be 88% on room air with saturation improving to 97% on 2 L nasal cannula. He is afebrile with BP of 101/54. CXR shows new hazy bilateral lung infiltrates. Labs are unremarkable. 10 systems reviewed and negative except as noted in HPI.       Past Medical History:   Diagnosis Date    Hematuria, microscopic     Memory loss     PUD (peptic ulcer disease)     no recent episodes      Past Surgical History:   Procedure Laterality Date    APPENDECTOMY      HX HERNIA REPAIR        No Known Allergies   Social History     Tobacco Use    Smoking status: Never Smoker    Smokeless tobacco: Never Used   Substance Use Topics    Alcohol use: Yes     Comment: no alcohol in 7 months      Family History   Problem Relation Age of Onset    Cancer Mother     Lung Disease Sister     Cancer Brother     Alcohol abuse Brother     Alcohol abuse Brother     Alcohol abuse Brother       Immunization History   Administered Date(s) Administered    Influenza Vaccine [employer]) PF (>6 Mo Flulaval, Fluarix, and >3 Yrs Zarina Bailey [id number]) 12/20/2019     PTA Medications:  Prior to Admission Medications   Prescriptions Last Dose Informant Patient Reported? Taking?   memantine (NAMENDA) 10 mg tablet   Yes Yes   Sig: Take 10 mg by mouth nightly. Facility-Administered Medications: None       Objective:     Patient Vitals for the past 24 hrs:   Temp Pulse Resp BP SpO2   11/16/20 2250     98 %   11/16/20 2249     (!) 88 %   11/16/20 2248  61 22  98 %   11/16/20 2208  (!) 55 18  (!) 89 %   11/16/20 2205  61 28  90 %   11/16/20 2203  62 21  (!) 89 %   11/16/20 2108 98.5 °F (36.9 °C) 71 18 (!) 101/54 97 %     Oxygen Therapy  O2 Sat (%): 98 % (11/16/20 2250)  Pulse via Oximetry: 61 beats per minute (11/16/20 2248)  O2 Device: Nasal cannula (11/16/20 2250)  O2 Flow Rate (L/min): 2 l/min (11/16/20 2250)    Estimated body mass index is 19.58 kg/m² as calculated from the following:    Height as of this encounter: 5' 7\" (1.702 m). Weight as of this encounter: 56.7 kg (125 lb). No intake or output data in the 24 hours ending 11/17/20 0116    *Note that automatically entered I/Os may not be accurate; dependent on patient compliance with collection and accurate  by assistants. Physical Exam:  General:     alert, awake, no acute distress. Well nourished  Head:   normocephalic, atraumatic  Eyes, Ears, nose: PERRL, EOMI. Normal Conjunctiva. Hard of hearing  Neck:    supple, non-tender. Trachea midline. Lungs:   CTAB, no wheezing, rhonchi, rales  Cardiac:   RRR, Normal S1 and S2  Abdomen:   Soft, non distended, nontender, +BS, no guarding/rebound  Extremities:   Warm, dry. No edema   Skin:   No rashes, no jaundice  Neuro:  AAOx 4 .  No gross focal neurological deficit  Psychiatric:  No anxiety, calm, cooperative      Data Review:   Recent Results (from the past 24 hour(s))   EKG, 12 LEAD, INITIAL    Collection Time: 11/16/20 9:14 PM   Result Value Ref Range    Ventricular Rate 71 BPM    Atrial Rate 71 BPM    QRS Duration 76 ms    Q-T Interval 374 ms    QTC Calculation (Bezet) 406 ms    Calculated R Axis 54 degrees    Calculated T Axis 72 degrees    Diagnosis       Sinus rhythm  When compared with ECG of 20-DEC-2019 06:39,  Prior EKG sinus bradycardia  Confirmed by Ryley Selby MD (), LUCERO ([id number]) on 11/16/2020 10:57:37 PM     CBC WITH AUTOMATED DIFF    Collection Time: 11/16/20  9:18 PM   Result Value Ref Range    WBC 7.2 4.3 - 11.1 K/uL    RBC 4.61 4.23 - 5.6 M/uL    HGB 13.0 (L) 13.6 - 17.2 g/dL    HCT 40.8 (L) 41.1 - 50.3 %    MCV 88.5 79.6 - 97.8 FL    MCH 28.2 26.1 - 32.9 PG    MCHC 31.9 31.4 - 35.0 g/dL    RDW 14.0 11.9 - 14.6 %    PLATELET 181 297 - 406 K/uL    MPV 9.8 9.4 - 12.3 FL    ABSOLUTE NRBC 0.00 0.0 - 0.2 K/uL    DF AUTOMATED      NEUTROPHILS 78 43 - 78 %    LYMPHOCYTES 14 13 - 44 %    MONOCYTES 7 4.0 - 12.0 %    EOSINOPHILS 0 (L) 0.5 - 7.8 %    BASOPHILS 0 0.0 - 2.0 %    IMMATURE GRANULOCYTES 0 0.0 - 5.0 %    ABS. NEUTROPHILS 5.6 1.7 - 8.2 K/UL    ABS. LYMPHOCYTES 1.0 0.5 - 4.6 K/UL    ABS. MONOCYTES 0.5 0.1 - 1.3 K/UL    ABS. EOSINOPHILS 0.0 0.0 - 0.8 K/UL    ABS. BASOPHILS 0.0 0.0 - 0.2 K/UL    ABS. IMM. GRANS. 0.0 0.0 - 0.5 K/UL   METABOLIC PANEL, COMPREHENSIVE    Collection Time: 11/16/20  9:18 PM   Result Value Ref Range    Sodium 143 136 - 145 mmol/L    Potassium 4.3 3.5 - 5.1 mmol/L    Chloride 110 (H) 98 - 107 mmol/L    CO2 24 21 - 32 mmol/L    Anion gap 9 7 - 16 mmol/L    Glucose 99 65 - 100 mg/dL    BUN 21 8 - 23 MG/DL    Creatinine 1.25 0.8 - 1.5 MG/DL    GFR est AA >60 >60 ml/min/1.73m2    GFR est non-AA 59 (L) >60 ml/min/1.73m2    Calcium 7.5 (L) 8.3 - 10.4 MG/DL    Bilirubin, total 0.4 0.2 - 1.1 MG/DL    ALT (SGPT) 7 (L) 12 - 65 U/L    AST (SGOT) 31 15 - 37 U/L    Alk.  phosphatase 53 50 - 136 U/L    Protein, total 6.3 6.3 - 8.2 g/dL    Albumin 2.8 (L) 3.2 - 4.6 g/dL    Globulin 3.5 2.3 - 3.5 g/dL    A-G Ratio 0.8 (L) 1.2 - 3.5     LACTIC ACID    Collection Time: 11/16/20  9:18 PM   Result Value Ref Range    Lactic acid 1.0 0.4 - 2.0 MMOL/L   PROCALCITONIN    Collection Time: 11/16/20  9:18 PM   Result Value Ref Range    Procalcitonin 0.11 ng/mL   TROPONIN-HIGH SENSITIVITY    Collection Time: 11/16/20  9:18 PM   Result Value Ref Range    Troponin-High Sensitivity 15.8 (H) 0 - 14 pg/mL   SARS-COV-2    Collection Time: 11/16/20 10:41 PM   Result Value Ref Range    Specimen source Nasopharyngeal      COVID-19 rapid test Detected (AA) NOTD         All Micro Results     Procedure Component Value Units Date/Time    CULTURE, BLOOD [[id number]] Collected:  11/16/20 2232    Order Status:  Completed Specimen:  Blood Updated:  11/16/20 2249    CULTURE, BLOOD [[id number]] Collected:  11/16/20 2117    Order Status:  Completed Specimen:  Blood Updated:  11/16/20 2130          Current Facility-Administered Medications   Medication Dose Route Frequency    0.9% sodium chloride infusion 250 mL  250 mL IntraVENous PRN    sodium chloride (NS) flush 5-40 mL  5-40 mL IntraVENous Q8H    sodium chloride (NS) flush 5-40 mL  5-40 mL IntraVENous PRN    acetaminophen (TYLENOL) tablet 650 mg  650 mg Oral Q6H PRN    Or    acetaminophen (TYLENOL) suppository 650 mg  650 mg Rectal Q6H PRN    polyethylene glycol (MIRALAX) packet 17 g  17 g Oral DAILY PRN    promethazine (PHENERGAN) tablet 12.5 mg  12.5 mg Oral Q6H PRN    Or    ondansetron (ZOFRAN) injection 4 mg  4 mg IntraVENous Q6H PRN    enoxaparin (LOVENOX) injection 30 mg  30 mg SubCUTAneous Q12H    memantine (NAMENDA) tablet 10 mg  10 mg Oral QHS    donepeziL (ARICEPT) tablet 5 mg  5 mg Oral QHS    sodium chloride 0.9 % bolus infusion 500 mL  500 mL IntraVENous ONCE     Current Outpatient Medications   Medication Sig    memantine (NAMENDA) 10 mg tablet Take 10 mg by mouth nightly. Other Studies:  Xr Chest Port    Result Date: 11/16/2020  EXAM: Chest x-ray.  INDICATION: Dyspnea. COMPARISON: Prior chest x-ray on December 19, 2019. TECHNIQUE: Frontal view chest x-ray. FINDINGS: There are new hazy infiltrates in the mid and lower lung fields bilaterally. The cardiac size and mediastinal contour are within normal limits. No pneumothorax or pleural effusion is seen. Old rib fractures are present on the right. IMPRESSION: New hazy bilateral lung infiltrates could relate to aspiration, or atypical pneumonia such as Covid 19. Assessment:     Active Hospital Problems    Diagnosis Date Noted    Acute respiratory failure with hypoxia ([facility] 11/17/2020    Pneumonia due to COVID-19 virus 11/17/2020    Alzheimer disease ([facility] 11/17/2020       Plan:     Acute Respiratory Failure with Hypoxia  COVID 19 Pneumonia  -O2 supplement as needed and wean as toleraetd  -convalescent plasma: patient and daughter is agreeable. Risk/benefits explained. -decadron + zinc/Vitamin C  -ID for remdesivir  -lovenox for DVT PPx and follow D-Dimer. Alzheimer Disease: continue with home medication./      Diet:  DIET REGULAR  DVT PPx: lovenox  Code: Full Code  Estimated LOS:  Greater than 2 midnights    Labs/Imaging Reviewed. Risk:  HIGH risk due to current condition and comorbid conditions as well as requiring frequent monitoring and high risk of decline. Plan discussed with staff, patient/family and are in agreement. Part of this note was written by using a voice dictation software and the note has been proof read but may still contain some some grammatical/other typographical errors.      Signed:  John Leigh MD

## 2020-11-17 NOTE — PROGRESS NOTES
TRANSFER - IN REPORT:    Verbal report received from Leigh RN(name) on Aleck Koyanagi  being received from ED(unit) for routine progression of care      Report consisted of patients Situation, Background, Assessment and   Recommendations(SBAR). Information from the following report(s) SBAR, Kardex, ED Summary, [facility] and Recent Results was reviewed with the receiving nurse. Opportunity for questions and clarification was provided. Assessment completed upon patients arrival to unit and care assumed.

## 2020-11-17 NOTE — PROGRESS NOTES
Hospitalist Note     Admit Date:  2020  8:58 PM   Name:  Valerie Enriquez   Age:  [de-identified] y.o.  :  1940   MRN:  [id number]   PCP:  Lisy Corado MD  Treatment Team: Attending Provider: Flako Handley MD; Care Manager: Ernestina Oseguera RN    HPI/Subjective:   Valerie Enriquez is a [de-identified] y.o. male with medical history significant for Alzheimer's dementia who presented from home with fever since Saturday, decreased appetite, increased confusion and hypoxia. Pt seen and examined at bedside this morning. Very hard of hearing and does not always understand my questions, but denies any fevrs, chills, n/v/d, abd pain, calf pain or swelling, loss of taste or smell. He is not eatingmuch. Does admit to purulent sputum production. No other complaints  Objective:     Patient Vitals for the past 24 hrs:   Temp Pulse Resp BP SpO2   20 1125     93 %   20 0900 97.7 °F (36.5 °C) 69 16 (!) 142/66 93 %   20 0310 96.9 °F (36.1 °C) 64 16 113/68 94 %   20 0201  (!) 55 19 (!) 98/59 93 %   20 0154  69 21  94 %   20 0148  69  (!) 98/55    20 0139  (!) 58  (!) 90/48 96 %   20 0119  68 21  97 %   20 2322  (!) 59 25 (!) 109/53 94 %   20 2250     98 %   20 2249     (!) 88 %   20 2248  61 22  98 %   20 2208  (!) 55 18  (!) 89 %   20 2205  61 28  90 %   20  62 21  (!) 89 %   208 98.5 °F (36.9 °C) 71 18 (!) 101/54 97 %     Oxygen Therapy  O2 Sat (%): 93 % (20 1125)  Pulse via Oximetry: 55 beats per minute (20 0201)  O2 Device: Nasal cannula (20 112)  O2 Flow Rate (L/min): 2 l/min (20 112)    Estimated body mass index is 19.58 kg/m² as calculated from the following:    Height as of this encounter: 5' 7\" (1.702 m). Weight as of this encounter: 56.7 kg (125 lb).     No intake or output data in the 24 hours ending 20 1152    *Note that automatically entered I/Os may not be accurate; dependent on patient compliance with collection and accurate  by techs. General:    Well nourished. Alert. CV:   RRR. No murmur, rub, or gallop. Lungs:   CTAB. No wheezing, rhonchi, or rales. Abdomen:   Soft, nontender, nondistended. Extremities: Warm and dry. No cyanosis or edema. Skin:     No rashes or jaundice. Neuro:  No gross focal deficits    Data Reviewed:  I have reviewed all labs, meds, and studies from the last 24 hours:  Recent Results (from the past 24 hour(s))   EKG, 12 LEAD, INITIAL    Collection Time: 11/16/20  9:14 PM   Result Value Ref Range    Ventricular Rate 71 BPM    Atrial Rate 71 BPM    QRS Duration 76 ms    Q-T Interval 374 ms    QTC Calculation (Bezet) 406 ms    Calculated R Axis 54 degrees    Calculated T Axis 72 degrees    Diagnosis       Sinus rhythm  When compared with ECG of 20-DEC-2019 06:39,  Prior EKG sinus bradycardia  Confirmed by Geraldo Villarreal MD (), LUCERO ([id number]) on 11/16/2020 10:57:37 PM     CULTURE, BLOOD    Collection Time: 11/16/20  9:17 PM    Specimen: Blood   Result Value Ref Range    Special Requests: NO SPECIAL REQUESTS  LEFT  FOREARM        Culture result: NO GROWTH AFTER 13 HOURS     CBC WITH AUTOMATED DIFF    Collection Time: 11/16/20  9:18 PM   Result Value Ref Range    WBC 7.2 4.3 - 11.1 K/uL    RBC 4.61 4.23 - 5.6 M/uL    HGB 13.0 (L) 13.6 - 17.2 g/dL    HCT 40.8 (L) 41.1 - 50.3 %    MCV 88.5 79.6 - 97.8 FL    MCH 28.2 26.1 - 32.9 PG    MCHC 31.9 31.4 - 35.0 g/dL    RDW 14.0 11.9 - 14.6 %    PLATELET 666 521 - 914 K/uL    MPV 9.8 9.4 - 12.3 FL    ABSOLUTE NRBC 0.00 0.0 - 0.2 K/uL    DF AUTOMATED      NEUTROPHILS 78 43 - 78 %    LYMPHOCYTES 14 13 - 44 %    MONOCYTES 7 4.0 - 12.0 %    EOSINOPHILS 0 (L) 0.5 - 7.8 %    BASOPHILS 0 0.0 - 2.0 %    IMMATURE GRANULOCYTES 0 0.0 - 5.0 %    ABS. NEUTROPHILS 5.6 1.7 - 8.2 K/UL    ABS. LYMPHOCYTES 1.0 0.5 - 4.6 K/UL    ABS. MONOCYTES 0.5 0.1 - 1.3 K/UL    ABS.  EOSINOPHILS 0.0 0.0 - 0.8 K/UL    ABS. BASOPHILS 0.0 0.0 - 0.2 K/UL    ABS. IMM. GRANS. 0.0 0.0 - 0.5 K/UL   METABOLIC PANEL, COMPREHENSIVE    Collection Time: 11/16/20  9:18 PM   Result Value Ref Range    Sodium 143 136 - 145 mmol/L    Potassium 4.3 3.5 - 5.1 mmol/L    Chloride 110 (H) 98 - 107 mmol/L    CO2 24 21 - 32 mmol/L    Anion gap 9 7 - 16 mmol/L    Glucose 99 65 - 100 mg/dL    BUN 21 8 - 23 MG/DL    Creatinine 1.25 0.8 - 1.5 MG/DL    GFR est AA >60 >60 ml/min/1.73m2    GFR est non-AA 59 (L) >60 ml/min/1.73m2    Calcium 7.5 (L) 8.3 - 10.4 MG/DL    Bilirubin, total 0.4 0.2 - 1.1 MG/DL    ALT (SGPT) 7 (L) 12 - 65 U/L    AST (SGOT) 31 15 - 37 U/L    Alk.  phosphatase 53 50 - 136 U/L    Protein, total 6.3 6.3 - 8.2 g/dL    Albumin 2.8 (L) 3.2 - 4.6 g/dL    Globulin 3.5 2.3 - 3.5 g/dL    A-G Ratio 0.8 (L) 1.2 - 3.5     LACTIC ACID    Collection Time: 11/16/20  9:18 PM   Result Value Ref Range    Lactic acid 1.0 0.4 - 2.0 MMOL/L   PROCALCITONIN    Collection Time: 11/16/20  9:18 PM   Result Value Ref Range    Procalcitonin 0.11 ng/mL   TROPONIN-HIGH SENSITIVITY    Collection Time: 11/16/20  9:18 PM   Result Value Ref Range    Troponin-High Sensitivity 15.8 (H) 0 - 14 pg/mL   CULTURE, BLOOD    Collection Time: 11/16/20 10:32 PM    Specimen: Blood   Result Value Ref Range    Special Requests: NO SPECIAL REQUESTS  LEFT  FOREARM        Culture result: NO GROWTH AFTER 12 HOURS     SARS-COV-2    Collection Time: 11/16/20 10:41 PM   Result Value Ref Range    Specimen source Nasopharyngeal      COVID-19 rapid test Detected (AA) NOTD     TROPONIN-HIGH SENSITIVITY    Collection Time: 11/17/20 12:55 AM   Result Value Ref Range    Troponin-High Sensitivity 14.1 (H) 0 - 14 pg/mL   D DIMER    Collection Time: 11/17/20 12:55 AM   Result Value Ref Range    D DIMER 0.55 <0.56 ug/ml(FEU)   TYPE & SCREEN    Collection Time: 11/17/20 12:55 AM   Result Value Ref Range    Crossmatch Expiration 11/20/2020,2359     ABO/Rh(D) O POSITIVE     Antibody screen NEG         Current Meds:  Current Facility-Administered Medications   Medication Dose Route Frequency    0.9% sodium chloride infusion 250 mL  250 mL IntraVENous PRN    sodium chloride (NS) flush 5-40 mL  5-40 mL IntraVENous Q8H    sodium chloride (NS) flush 5-40 mL  5-40 mL IntraVENous PRN    acetaminophen (TYLENOL) tablet 650 mg  650 mg Oral Q6H PRN    Or    acetaminophen (TYLENOL) suppository 650 mg  650 mg Rectal Q6H PRN    polyethylene glycol (MIRALAX) packet 17 g  17 g Oral DAILY PRN    promethazine (PHENERGAN) tablet 12.5 mg  12.5 mg Oral Q6H PRN    Or    ondansetron (ZOFRAN) injection 4 mg  4 mg IntraVENous Q6H PRN    enoxaparin (LOVENOX) injection 30 mg  30 mg SubCUTAneous Q12H    memantine (NAMENDA) tablet 10 mg  10 mg Oral QHS    donepeziL (ARICEPT) tablet 5 mg  5 mg Oral QHS    zinc sulfate tablet 220 mg  220 mg Oral DAILY    ascorbic acid (vitamin C) (VITAMIN C) tablet 1,000 mg  1,000 mg Oral BID    dexAMETHasone (DECADRON) tablet 6 mg  6 mg Oral DAILY    pantoprazole (PROTONIX) tablet 40 mg  40 mg Oral ACB    remdesivir 200 mg in 0.9% sodium chloride 250 mL IVPB  200 mg IntraVENous ONCE    Followed by   Keo Bradford ON 11/18/2020] remdesivir 100 mg in 0.9% sodium chloride 250 mL IVPB  100 mg IntraVENous Q24H       Other Studies:  No results found for this visit on 11/16/20. Xr Chest Port    Result Date: 11/16/2020  EXAM: Chest x-ray. INDICATION: Dyspnea. COMPARISON: Prior chest x-ray on December 19, 2019. TECHNIQUE: Frontal view chest x-ray. FINDINGS: There are new hazy infiltrates in the mid and lower lung fields bilaterally. The cardiac size and mediastinal contour are within normal limits. No pneumothorax or pleural effusion is seen. Old rib fractures are present on the right. IMPRESSION: New hazy bilateral lung infiltrates could relate to aspiration, or atypical pneumonia such as Covid 19.       All Micro Results     Procedure Component Value Units Date/Time    CULTURE, BLOOD [[id number]] Collected:  11/16/20 2232    Order Status:  Completed Specimen:  Blood Updated:  11/17/20 1123     Special Requests: --        NO SPECIAL REQUESTS  LEFT  FOREARM       Culture result: NO GROWTH AFTER 12 HOURS       CULTURE, BLOOD [[id number]] Collected:  11/16/20 2117    Order Status:  Completed Specimen:  Blood Updated:  11/17/20 1123     Special Requests: --        NO SPECIAL REQUESTS  LEFT  FOREARM       Culture result: NO GROWTH AFTER 13 HOURS             SARS-CoV-2 Lab Results  \"Novel Coronavirus\" Test: No results found for: COV2NT   \"Emergent Disease\" Test: No results found for: EDPR  \"SARS-COV-2\" Test: No results found for: XGCOVT  Rapid Test:   Lab Results   Component Value Date/Time    COVR Detected (AA) 11/16/2020 10:41 PM            Assessment and Plan:     Hospital Problems as of 11/17/2020 Date Reviewed: 2/28/2017          Codes Class Noted - Resolved POA    * (Principal) Acute respiratory failure with hypoxia ([facility] ICD-10-CM: J96.01  ICD-9-CM: 518.81  11/17/2020 - Present Unknown        Pneumonia due to COVID-19 virus ICD-10-CM: U07.1, J12.89  ICD-9-CM: 480.8  11/17/2020 - Present Unknown        Alzheimer disease ([facility] ICD-10-CM: G30.9, F02.80  ICD-9-CM: 331.0  11/17/2020 - Present Unknown              Plan:  Acute Respiratory Failure with Hypoxia  COVID 19 Pneumonia  -O2 supplement as needed and wean as toleraetd  -convalescent plasma  -decadron + zinc/Vitamin C  -ID for remdesivir  -lovenox for DVT PPx , D-Dimer elevated to 0.55, cont to monitor  -procal 0.11, will hold Abx for now     Alzheimer Disease: continue with home medication. DC planning/Dispo:  Pending further clinical course.        Diet:  DIET REGULAR  DVT ppx:  Lovenox    Signed:  Arun Vail MD

## 2020-11-17 NOTE — PROGRESS NOTES
Spoke with Joceline Carrizales, patients daughter and POA. Since patient is unable to relay messages accurately to the daughter, she would like for an MD to call her with an update on the plan of care today.    Number is [phone]

## 2020-11-17 NOTE — ED NOTES
Patient O2 saturation dropped to 88% on RA while walking in place per MD order. Gabriel Pillai MD made aware. Patient placed back on 2L of oxygen via nc per order.

## 2020-11-17 NOTE — PROGRESS NOTES
MSN, CM:  Attempted to speak with patient this AM about discharge planning with no success. Contacted patient's daughter \"Tiffany\" (emergency contact) and all below information was gathered by her. Patient lives alone in a 1st floor apartment in a senior living community. Patient's two daughter's \"Tiffany and Yokasta\" live close by and helps with ADL's. Patient requires no equipment for ambulation and walks a few miles daily. Patient has comfort care that comes every other Tuesday. Noé Wilcoxr denies any home oxygen, HH or rehab. Noé Frazier confirms PCP is Dr. Cassie Villarreal and she drives him to all appointments. PT and OT consulted for evaluation and recommendations. Case Management will continue to follow for discharge needs. Care Management Interventions  PCP Verified by CM: Yes(Dr. Cassie Villarreal)  Mode of Transport at Discharge:  Other (see comment)(family to transport)  Transition of Care Consult (CM Consult): Discharge Planning  Physical Therapy Consult: Yes  Occupational Therapy Consult: Yes  Current Support Network: Lives Alone, Other(1st floor apartment)  Confirm Follow Up Transport: Family  Freedom of Choice List was Provided with Basic Dialogue that Supports the Patient's Individualized Plan of Care/Goals, Treatment Preferences and Shares the Quality Data Associated with the Providers?: Yes  Discharge Location  Discharge Placement: Home

## 2020-11-17 NOTE — ACP (ADVANCE CARE PLANNING)
MSN, CM:  Spoke with Mamie Pinon which both agree that patient is a full code and Juvencio Childress [phone] is primary decision maker and Genie Patterson [phone] is secondary decision maker. Patient is confused and unable to confirm at this time.

## 2020-11-17 NOTE — ED NOTES
TRANSFER - OUT REPORT:    Verbal report given to POORNIMA Zeng(name) on Daniel Roberts  being transferred to 808(unit) for routine progression of care       Report consisted of patients Situation, Background, Assessment and   Recommendations(SBAR). Information from the following report(s) SBAR and ED Summary was reviewed with the receiving nurse. Lines:   Peripheral IV 11/16/20 Left Forearm (Active)   Site Assessment Clean, dry, & intact 11/16/20 2125   Phlebitis Assessment 0 11/16/20 2125   Infiltration Assessment 0 11/16/20 2125   Dressing Status Clean, dry, & intact 11/16/20 2125   Dressing Type 4 X 4 11/16/20 2125   Hub Color/Line Status Pink 11/16/20 2125   Alcohol Cap Used Yes 11/16/20 2125       Peripheral IV 11/16/20 Right Antecubital (Active)   Site Assessment Clean, dry, & intact 11/16/20 2250   Phlebitis Assessment 0 11/16/20 2250   Infiltration Assessment 0 11/16/20 2250   Dressing Status Clean, dry, & intact 11/16/20 2250   Dressing Type 4 X 4 11/16/20 2250   Hub Color/Line Status [place] 11/16/20 2250   Alcohol Cap Used Yes 11/16/20 2250        Opportunity for questions and clarification was provided.       Patient transported with:   O2 @ 2 liters

## 2020-11-17 NOTE — CONSULTS
Infectious Disease Non-face to Face Encounter    Today's date: 11/17/2020  Admit date: 11/16/2020    Impression:     1. COVID-19 infection 11/16/20  2. Acute hypoxic respiratory failure    Recommendations:      Agree with convalescent serum if available  · Patient is a candidate for Remdisivir. Will prescribe now. · Please provide patient with FDA EUA fact sheet (links listed below) PRIOR to initiation of medication  · Please continue to check daily CMPs while on RDV  · Agree with dexamethasone 6 mg po Q24h for 5-7 days pending clinical improvement. Given large volume of patients with COVID, we are unable to follow each patient closely. Please call us if there is a clinical changes/questions/concerns through [facility] for Patients And Parent/Caregivers   Fact Sheets for Patients and Parent/Caregivers - [place]    Clinical Synopsis:     Estiven Garza MD messaged requesting ID opinion/advice to assist on the care and management of this patient. Patient's chart was reviewed including relevant notes, labs, imaging, medications, pathology, PMH, PSH, FH, and SH. Briefly, [de-identified] yo patient with dementia admitted on 11/16/2020 after presenting with fever for 4 days. Patient was found to be hypoxic in ED at 88% on RA an placed on oxygen with improved sats. She has had decreased po intake for several days and hypotensive on arrival, 92/50. CRT looks at baseline. CXR with bilateral infiltrates. Covid positive in ED      Allergies:  No Known Allergies    Anticoagulants:  Key Anti-Platelet Anticoagulant Meds     The patient is on no antiplatelet meds or anticoagulants.            Objective:     Physical Exam:    Vitals:   Current   Temperature: 96.9 °F (36.1 °C)   Heart Rate: 64   Resp Rate: 16   Blood Pressure: 113/68   Oxygen Sats: 94 %         Patient Vitals for the past 12 hrs:   Temp Pulse Resp BP SpO2   11/17/20 0310 96.9 °F (36.1 °C) 64 16 113/68 94 %   11/17/20 0201  (!) 55 19 (!) 98/59 93 % 11/17/20 0154  69 21  94 %   11/17/20 0148  69  (!) 98/55    11/17/20 0139  (!) 58  (!) 90/48 96 %   11/17/20 0119  68 21  97 %   11/16/20 2322  (!) 59 25 (!) 109/53 94 %   11/16/20 2250     98 %   11/16/20 2249     (!) 88 %   11/16/20 2248  61 22  98 %   11/16/20 2208  (!) 55 18  (!) 89 %   11/16/20 2205  61 28  90 %   11/16/20 2203  62 21  (!) 89 %   11/16/20 2108 98.5 °F (36.9 °C) 71 18 (!) 101/54 97 %       No components found for: QTC      Labs:     COVID 19 related labs:  No results found for: CRP, LDH    CBC:      CBC WITH AUTOMATED DIFF    Collection Time: 11/16/20  9:18 PM   Result Value Ref Range    WBC 7.2 4.3 - 11.1 K/uL    RBC 4.61 4.23 - 5.6 M/uL    HGB 13.0 (L) 13.6 - 17.2 g/dL    HCT 40.8 (L) 41.1 - 50.3 %    MCV 88.5 79.6 - 97.8 FL    MCH 28.2 26.1 - 32.9 PG    MCHC 31.9 31.4 - 35.0 g/dL    RDW 14.0 11.9 - 14.6 %    PLATELET 070 900 - 604 K/uL    MPV 9.8 9.4 - 12.3 FL    ABSOLUTE NRBC 0.00 0.0 - 0.2 K/uL    DF AUTOMATED      NEUTROPHILS 78 43 - 78 %    LYMPHOCYTES 14 13 - 44 %    MONOCYTES 7 4.0 - 12.0 %    EOSINOPHILS 0 (L) 0.5 - 7.8 %    BASOPHILS 0 0.0 - 2.0 %    IMMATURE GRANULOCYTES 0 0.0 - 5.0 %    ABS. NEUTROPHILS 5.6 1.7 - 8.2 K/UL    ABS. LYMPHOCYTES 1.0 0.5 - 4.6 K/UL    ABS. MONOCYTES 0.5 0.1 - 1.3 K/UL    ABS. EOSINOPHILS 0.0 0.0 - 0.8 K/UL    ABS. BASOPHILS 0.0 0.0 - 0.2 K/UL    ABS. IMM.  GRANS. 0.0 0.0 - 0.5 K/UL       CMP:      METABOLIC PANEL, COMPREHENSIVE    Collection Time: 11/16/20  9:18 PM   Result Value Ref Range    Sodium 143 136 - 145 mmol/L    Potassium 4.3 3.5 - 5.1 mmol/L    Chloride 110 (H) 98 - 107 mmol/L    CO2 24 21 - 32 mmol/L    Anion gap 9 7 - 16 mmol/L    Glucose 99 65 - 100 mg/dL    BUN 21 8 - 23 MG/DL    Creatinine 1.25 0.8 - 1.5 MG/DL    GFR est AA >60 >60 ml/min/1.73m2    GFR est non-AA 59 (L) >60 ml/min/1.73m2    Calcium 7.5 (L) 8.3 - 10.4 MG/DL    Bilirubin, total 0.4 0.2 - 1.1 MG/DL    ALT (SGPT) 7 (L) 12 - 65 U/L    AST (SGOT) 31 15 - 37 U/L    Alk.  phosphatase 53 50 - 136 U/L    Protein, total 6.3 6.3 - 8.2 g/dL    Albumin 2.8 (L) 3.2 - 4.6 g/dL    Globulin 3.5 2.3 - 3.5 g/dL    A-G Ratio 0.8 (L) 1.2 - 3.5         Microbiology:     All Micro Results     Procedure Component Value Units Date/Time    CULTURE, BLOOD [[id number]] Collected:  11/16/20 2232    Order Status:  Completed Specimen:  Blood Updated:  11/16/20 2249    CULTURE, BLOOD [[id number]] Collected:  11/16/20 2117    Order Status:  Completed Specimen:  Blood Updated:  11/16/20 2130          Recent Imaging:     Reviewed    Signed By: Viviana Parish MD MD    November 17, 2020      Time spent on the above: 4 minutes

## 2020-11-17 NOTE — ED TRIAGE NOTES
Pt cedrick to emergent MD today due to having a fever x3 days. At the MD his temp was 102. 1. pt was given tylenol and a gram of rocephin and 1 liter of fluids. [facility] brought patient over to ER. Repeat temp with EMS was 99. 6. pt complaining of SOB. Emergent MD sent patient to be checked for pneumonia and covid. Pt did not meet sepsis criteria with EMS.  /50, HR 70, 97 2 liters, RR 18. 20 g R AC

## 2020-11-17 NOTE — PROGRESS NOTES
Patient received first dose of Remdesivir. Patient self removed IV. Unable to initiate a new IV. Will order plasma when patent IV access is achieved.

## 2020-11-17 NOTE — ED PROVIDER NOTES
Chief complaint : Fevers and chills, and dyspnea    HISTORY OF PRESENT ILLNESS :  Location : Intrathoracic    Quality : Fever to 102.7 today, nonproductive cough    Quantity : Constant    Timing : 3 days    Severity : Moderate    Context : Sent from an urgent care where they discovered bibasilar infiltrates but otherwise normal labs    Alleviating / exacerbating factors : Feeling better after some Tylenol, and fluids    Associated Symptoms : Patient has dementia but has been a little more confused than usual   no nausea, no vomiting, really no body aches, no alteration of sense of taste or smell    Sats were in the low 90s, and blood pressure was 92/50 at the urgent care prior to treatment           Past Medical History:   Diagnosis Date    Hematuria, microscopic     Memory loss     PUD (peptic ulcer disease)     no recent episodes       Past Surgical History:   Procedure Laterality Date    APPENDECTOMY      HX HERNIA REPAIR           Family History:   Problem Relation Age of Onset    Cancer Mother     Lung Disease Sister     Cancer Brother     Alcohol abuse Brother     Alcohol abuse Brother     Alcohol abuse Brother        Social History     Socioeconomic History    Marital status:      Spouse name: Not on file    Number of children: Not on file    Years of education: Not on file    Highest education level: Not on file   Occupational History    Not on file   Social Needs    Financial resource strain: Not on file    Food insecurity     Worry: Not on file     Inability: Not on file   [employer] needs     Medical: Not on file     Non-medical: Not on file   Tobacco Use    Smoking status: Never Smoker    Smokeless tobacco: Never Used   Substance and Sexual Activity    Alcohol use: Yes     Comment: no alcohol in 7 months    Drug use: Not on file    Sexual activity: Not on file   Lifestyle    Physical activity     Days per week: Not on file     Minutes per session: Not on file    Stress: Not on file   Relationships    Social connections     Talks on phone: Not on file     Gets together: Not on file     Attends Yarsani service: Not on file     Active member of club or organization: Not on file     Attends meetings of clubs or organizations: Not on file     Relationship status: Not on file    Intimate partner violence     Fear of current or ex partner: Not on file     Emotionally abused: Not on file     Physically abused: Not on file     Forced sexual activity: Not on file   Other Topics Concern    Not on file   Social History Narrative    Not on file         ALLERGIES: Patient has no known allergies. Review of Systems   Constitutional: Positive for chills, fatigue and fever. HENT: Negative for congestion, rhinorrhea and sore throat. Eyes: Negative for discharge and redness. Respiratory: Positive for cough and shortness of breath. Cardiovascular: Negative for chest pain and palpitations. Gastrointestinal: Negative for abdominal pain, diarrhea, nausea and vomiting. Musculoskeletal: Negative for arthralgias, back pain and myalgias. Skin: Negative for rash. Neurological: Negative for dizziness and headaches. All other systems reviewed and are negative. Vitals:    11/16/20 2108   BP: (!) 101/54   Pulse: 71   Resp: 18   Temp: 98.5 °F (36.9 °C)   SpO2: 97%   Weight: 56.7 kg (125 lb)   Height: 5' 7\" (1.702 m)            Physical Exam  Vitals signs and nursing note reviewed. Constitutional:       General: He is not in acute distress. Appearance: Normal appearance. He is well-developed. He is not ill-appearing, toxic-appearing or diaphoretic. Comments: Sats are 91% on room air at rest in the bed   HENT:      Head: Normocephalic and atraumatic. Eyes:      General: No scleral icterus. Right eye: No discharge. Left eye: No discharge. Conjunctiva/sclera: Conjunctivae normal.      Pupils: Pupils are equal, round, and reactive to light. Neck:      Musculoskeletal: Normal range of motion and neck supple. Cardiovascular:      Rate and Rhythm: Normal rate and regular rhythm. Heart sounds: Normal heart sounds. No murmur. No gallop. Pulmonary:      Effort: Pulmonary effort is normal. No respiratory distress. Breath sounds: Examination of the right-lower field reveals rales. Examination of the left-lower field reveals rales. Rales present. No wheezing. Abdominal:      General: Bowel sounds are normal.      Palpations: Abdomen is soft. Tenderness: There is no abdominal tenderness. There is no guarding. Musculoskeletal: Normal range of motion. Skin:     General: Skin is warm and dry. Neurological:      General: No focal deficit present. Mental Status: He is alert and oriented to person, place, and time. Mental status is at baseline. Motor: No abnormal muscle tone. Comments: cni 2-12 grossly   Psychiatric:         Mood and Affect: Mood normal.         Behavior: Behavior normal.          MDM  Number of Diagnoses or Management Options  Hypoxemia:   Pneumonia due to 2019 novel coronavirus:   Diagnosis management comments: Medical decision making note:  Pneumonia due to COVID-19 With mild hypoxemia  Urgent care had given Rocephin prior to arrival, will go ahead and add Zithromax to round out coverage for community-acquired pneumonia, rapid Covid is negative here, patient with hypoxemia to 88% or worse while marching in place on room air. Admit to hospitalist service  This concludes the \"medical decision making note\" part of this emergency department visit note.          Amount and/or Complexity of Data Reviewed  Clinical lab tests: reviewed and ordered (Results Include:    Recent Results (from the past 24 hour(s))  -EKG, 12 LEAD, INITIAL  Collection Time: 11/16/20  9:14 PM       Result                      Value             Ref Range           Ventricular Rate            71                BPM                 Atrial Rate                 71                BPM                 QRS Duration                76                ms                  Q-T Interval                374               ms                  QTC Calculation (Bezet)     406               ms                  Calculated R Axis           54                degrees             Calculated T Axis           72                degrees             Diagnosis                                                     Sinus rhythm When compared with ECG of 20-DEC-2019 06:39, Prior EKG sinus bradycardia Confirmed by Coy Marsh MD (), LUCERO ([id number]) on 11/16/2020 10:57:37 PM   -CBC WITH AUTOMATED DIFF  Collection Time: 11/16/20  9:18 PM       Result                      Value             Ref Range           WBC                         7.2               4.3 - 11.1 K*       RBC                         4.61              4.23 - 5.6 M*       HGB                         13.0 (L)          13.6 - 17.2 *       HCT                         40.8 (L)          41.1 - 50.3 %       MCV                         88.5              79.6 - 97.8 *       MCH                         28.2              26.1 - 32.9 *       MCHC                        31.9              31.4 - 35.0 *       RDW                         14.0              11.9 - 14.6 %       PLATELET                    438               150 - 450 K/*       MPV                         9.8               9.4 - 12.3 FL       ABSOLUTE NRBC               0.00              0.0 - 0.2 K/*       DF                          AUTOMATED                             NEUTROPHILS                 78                43 - 78 %           LYMPHOCYTES                 14                13 - 44 %           MONOCYTES                   7                 4.0 - 12.0 %        EOSINOPHILS                 0 (L)             0.5 - 7.8 %         BASOPHILS                   0                 0.0 - 2.0 %         IMMATURE GRANULOCYTES       0                 0.0 - 5.0 %         ABS.  NEUTROPHILS 5.6               1.7 - 8.2 K/*       ABS. LYMPHOCYTES            1.0               0.5 - 4.6 K/*       ABS. MONOCYTES              0.5               0.1 - 1.3 K/*       ABS. EOSINOPHILS            0.0               0.0 - 0.8 K/*       ABS. BASOPHILS              0.0               0.0 - 0.2 K/*       ABS. IMM. GRANS.            0.0               0.0 - 0.5 K/*  -METABOLIC PANEL, COMPREHENSIVE  Collection Time: 11/16/20  9:18 PM       Result                      Value             Ref Range           Sodium                      143               136 - 145 mm*       Potassium                   4.3               3.5 - 5.1 mm*       Chloride                    110 (H)           98 - 107 mmo*       CO2                         24                21 - 32 mmol*       Anion gap                   9                 7 - 16 mmol/L       Glucose                     99                65 - 100 mg/*       BUN                         21                8 - 23 MG/DL        Creatinine                  1.25              0.8 - 1.5 MG*       GFR est AA                  >60               >60 ml/min/1*       GFR est non-AA              59 (L)            >60 ml/min/1*       Calcium                     7.5 (L)           8.3 - 10.4 M*       Bilirubin, total            0.4               0.2 - 1.1 MG*       ALT (SGPT)                  7 (L)             12 - 65 U/L         AST (SGOT)                  31                15 - 37 U/L         Alk.  phosphatase            53                50 - 136 U/L        Protein, total              6.3               6.3 - 8.2 g/*       Albumin                     2.8 (L)           3.2 - 4.6 g/*       Globulin                    3.5               2.3 - 3.5 g/*       A-G Ratio                   0.8 (L)           1.2 - 3.5      -LACTIC ACID  Collection Time: 11/16/20  9:18 PM       Result                      Value             Ref Range           Lactic acid                 1.0               0.4 - 2.0 MM*  -TROPONIN-HIGH SENSITIVITY  Collection Time: 11/16/20  9:18 PM       Result                      Value             Ref Range           Troponin-High Sensitiv*     15.8 (H)          0 - 14 pg/mL   -SARS-COV-2  Collection Time: 11/16/20 10:41 PM       Result                      Value             Ref Range           Specimen source                                               Nasopharyngeal       COVID-19 rapid test         Detected (AA)     NOTD           )  Tests in the radiology section of CPT®: reviewed and ordered (XR CHEST PORT   Final Result    IMPRESSION: New hazy bilateral lung infiltrates could relate to aspiration, or atypical pneumonia such as Covid 19.     )           Procedures

## 2020-11-18 LAB
ALBUMIN SERPL-MCNC: 3 G/DL (ref 3.2–4.6)
ALBUMIN/GLOB SERPL: 0.8 {RATIO} (ref 1.2–3.5)
ALP SERPL-CCNC: 56 U/L (ref 50–136)
ALT SERPL-CCNC: 12 U/L (ref 12–65)
ANION GAP SERPL CALC-SCNC: 8 MMOL/L (ref 7–16)
AST SERPL-CCNC: 29 U/L (ref 15–37)
BASOPHILS # BLD: 0 K/UL (ref 0–0.2)
BASOPHILS NFR BLD: 0 % (ref 0–2)
BILIRUB SERPL-MCNC: 0.2 MG/DL (ref 0.2–1.1)
BUN SERPL-MCNC: 24 MG/DL (ref 8–23)
CALCIUM SERPL-MCNC: 8 MG/DL (ref 8.3–10.4)
CHLORIDE SERPL-SCNC: 110 MMOL/L (ref 98–107)
CO2 SERPL-SCNC: 23 MMOL/L (ref 21–32)
CREAT SERPL-MCNC: 1.06 MG/DL (ref 0.8–1.5)
DIFFERENTIAL METHOD BLD: ABNORMAL
EOSINOPHIL # BLD: 0 K/UL (ref 0–0.8)
EOSINOPHIL NFR BLD: 0 % (ref 0.5–7.8)
ERYTHROCYTE [DISTWIDTH] IN BLOOD BY AUTOMATED COUNT: 13.7 % (ref 11.9–14.6)
GLOBULIN SER CALC-MCNC: 3.7 G/DL (ref 2.3–3.5)
GLUCOSE SERPL-MCNC: 117 MG/DL (ref 65–100)
HCT VFR BLD AUTO: 41 % (ref 41.1–50.3)
HGB BLD-MCNC: 13 G/DL (ref 13.6–17.2)
IMM GRANULOCYTES # BLD AUTO: 0.1 K/UL (ref 0–0.5)
IMM GRANULOCYTES NFR BLD AUTO: 1 % (ref 0–5)
LYMPHOCYTES # BLD: 0.7 K/UL (ref 0.5–4.6)
LYMPHOCYTES NFR BLD: 11 % (ref 13–44)
MCH RBC QN AUTO: 27.6 PG (ref 26.1–32.9)
MCHC RBC AUTO-ENTMCNC: 31.7 G/DL (ref 31.4–35)
MCV RBC AUTO: 87 FL (ref 79.6–97.8)
MONOCYTES # BLD: 0.3 K/UL (ref 0.1–1.3)
MONOCYTES NFR BLD: 4 % (ref 4–12)
NEUTS SEG # BLD: 5.7 K/UL (ref 1.7–8.2)
NEUTS SEG NFR BLD: 84 % (ref 43–78)
NRBC # BLD: 0 K/UL (ref 0–0.2)
PLATELET # BLD AUTO: 488 K/UL (ref 150–450)
PMV BLD AUTO: 9.7 FL (ref 9.4–12.3)
POTASSIUM SERPL-SCNC: 3.9 MMOL/L (ref 3.5–5.1)
PROT SERPL-MCNC: 6.7 G/DL (ref 6.3–8.2)
RBC # BLD AUTO: 4.71 M/UL (ref 4.23–5.6)
SODIUM SERPL-SCNC: 141 MMOL/L (ref 136–145)
WBC # BLD AUTO: 6.8 K/UL (ref 4.3–11.1)

## 2020-11-18 PROCEDURE — 85025 COMPLETE CBC W/AUTO DIFF WBC: CPT

## 2020-11-18 PROCEDURE — 74011250636 HC RX REV CODE- 250/636: Performed by: INTERNAL MEDICINE

## 2020-11-18 PROCEDURE — 97165 OT EVAL LOW COMPLEX 30 MIN: CPT

## 2020-11-18 PROCEDURE — 97110 THERAPEUTIC EXERCISES: CPT

## 2020-11-18 PROCEDURE — 97161 PT EVAL LOW COMPLEX 20 MIN: CPT

## 2020-11-18 PROCEDURE — 36415 COLL VENOUS BLD VENIPUNCTURE: CPT

## 2020-11-18 PROCEDURE — 74011000258 HC RX REV CODE- 258: Performed by: INTERNAL MEDICINE

## 2020-11-18 PROCEDURE — 80053 COMPREHEN METABOLIC PANEL: CPT

## 2020-11-18 PROCEDURE — XW13325 TRANSFUSION OF CONVALESCENT PLASMA (NONAUTOLOGOUS) INTO PERIPHERAL VEIN, PERCUTANEOUS APPROACH, NEW TECHNOLOGY GROUP 5: ICD-10-PCS | Performed by: INTERNAL MEDICINE

## 2020-11-18 PROCEDURE — 65270000029 HC RM PRIVATE

## 2020-11-18 PROCEDURE — 74011000250 HC RX REV CODE- 250: Performed by: INTERNAL MEDICINE

## 2020-11-18 PROCEDURE — 36430 TRANSFUSION BLD/BLD COMPNT: CPT

## 2020-11-18 PROCEDURE — 74011250637 HC RX REV CODE- 250/637: Performed by: INTERNAL MEDICINE

## 2020-11-18 PROCEDURE — 97535 SELF CARE MNGMENT TRAINING: CPT

## 2020-11-18 RX ADMIN — Medication 220 MG: at 08:45

## 2020-11-18 RX ADMIN — Medication 10 ML: at 22:06

## 2020-11-18 RX ADMIN — DEXAMETHASONE 6 MG: 4 TABLET ORAL at 08:49

## 2020-11-18 RX ADMIN — REMDESIVIR 100 MG: 100 INJECTION, POWDER, LYOPHILIZED, FOR SOLUTION INTRAVENOUS at 11:00

## 2020-11-18 RX ADMIN — Medication 10 ML: at 14:00

## 2020-11-18 RX ADMIN — ENOXAPARIN SODIUM 30 MG: 40 INJECTION SUBCUTANEOUS at 22:05

## 2020-11-18 RX ADMIN — OXYCODONE HYDROCHLORIDE AND ACETAMINOPHEN 1000 MG: 500 TABLET ORAL at 17:28

## 2020-11-18 RX ADMIN — DONEPEZIL HYDROCHLORIDE 5 MG: 5 TABLET, FILM COATED ORAL at 22:05

## 2020-11-18 RX ADMIN — Medication 10 ML: at 05:22

## 2020-11-18 RX ADMIN — OXYCODONE HYDROCHLORIDE AND ACETAMINOPHEN 1000 MG: 500 TABLET ORAL at 08:45

## 2020-11-18 RX ADMIN — MEMANTINE HYDROCHLORIDE 10 MG: 5 TABLET ORAL at 22:05

## 2020-11-18 RX ADMIN — ENOXAPARIN SODIUM 30 MG: 40 INJECTION SUBCUTANEOUS at 08:45

## 2020-11-18 RX ADMIN — PANTOPRAZOLE SODIUM 40 MG: 40 TABLET, DELAYED RELEASE ORAL at 08:45

## 2020-11-18 NOTE — PROGRESS NOTES
Problem: Self Care Deficits Care Plan (Adult)  Goal: *Acute Goals and Plan of Care (Insert Text)  Outcome: Resolved/Met  Note: 1. Pt will toilet independently   2. Pt will complete grooming and hygiene at sink independently    Timeframe: 7 days       OCCUPATIONAL THERAPY: Initial Assessment, Daily Note, and Discharge 11/18/2020  INPATIENT: OT Visit Days: 1  Payor: Keo Franky / Plan: [address] / Product Type: Managed Care Medicare /      NAME/AGE/GENDER: Levi Omer is a [de-identified] y.o. male   PRIMARY DIAGNOSIS:  Acute respiratory failure with hypoxia ([facility] [address] [J96.01]  Pneumonia due to COVID-19 virus [U07.1, J12.89] Acute respiratory failure with hypoxia ([address] Acute respiratory failure with hypoxia ([facility] [address]        ICD-10: Treatment Diagnosis:    Generalized Muscle Weakness (M62.81)   Precautions/Allergies:     Patient has no known allergies. ASSESSMENT:     Mr. Deanna Myers presents with the above diagnosis. Pt had difficulty providing home/ PLOF information d/t being very [place] and cognitive deficits from underlying Alzheimer's dementia, stated that he lives alone and is independent with ADLs and his 2 daughters who are both nurses check on him often and assist as needed. Pt does not use an AD for mobility and denies having had any recent falls. This session, pt greeted up in restroom, pleasant and agreeable to OT session. Pt pleasantly confused, oriented only to himself. Pt toileted independently, then completed ADLs at sink independently. Pt had a moment of confusion and attempted to wash his hands in the tub when he was unable to find a sink in the bathroom, easily redirected and oriented to location of sink. Pt fully dressed, made his own bed. Pt on room air, SpO2 remained 95% with activity. Pt appears to be at or near his functional baseline and does not require further skilled OT services at this time. No d/c needs.        REHAB RECOMMENDATIONS (at time of discharge pending progress):    Placement: It is my opinion, based on this patient's performance to date, that Mr. Shelly Bullard may benefit from being discharged with NO further skilled therapy due to a proven ability to function at baseline. Equipment:   None at this time              OCCUPATIONAL PROFILE AND HISTORY:   History of Present Injury/Illness (Reason for Referral):  See H&P  Past Medical History/Comorbidities:   Mr. Shelly Bullard  has a past medical history of Hematuria, microscopic, Memory loss, and PUD (peptic ulcer disease). He also has no past medical history of Adverse effect of anesthesia, Diabetes ([address] Difficult intubation, Malignant hyperthermia due to anesthesia, Nausea & vomiting, or Pseudocholinesterase deficiency. Mr. Shelly Bullard  has a past surgical history that includes pr appendectomy and hx hernia repair. Social History/Living Environment:   Home Environment: Private residence  Living Alone: Yes  Support Systems: Family member(s)  Patient Expects to be Discharged to[de-identified] Private residence  Prior Level of Function/Work/Activity:  See assessment     Number of Personal Factors/Comorbidities that affect the Plan of Care: Brief history (0):  LOW COMPLEXITY   ASSESSMENT OF OCCUPATIONAL PERFORMANCE[de-identified]   Activities of Daily Living:   Basic ADLs (From Assessment) Complex ADLs (From Assessment)   Feeding: Independent  Oral Facial Hygiene/Grooming: Independent  Bathing: Independent  Upper Body Dressing: Independent  Lower Body Dressing: Independent  Toileting: Independent     Grooming/Bathing/Dressing Activities of Daily Living   Grooming  Grooming Assistance: Independent             Toileting  Toileting Assistance: Independent     Functional Transfers  Bathroom Mobility: Independent  Toilet Transfer : Independent     Bed/Mat Mobility  Sit to Stand: Independent  Stand to Sit: Independent  Bed to Chair: Independent     Most Recent Physical Functioning:   Gross Assessment:  AROM: Within functional limits  Strength:  Within functional limits               Posture:     Balance:  Sitting: Intact  Standing: Intact Bed Mobility:     Wheelchair Mobility:     Transfers:  Sit to Stand: Independent  Stand to Sit: Independent  Bed to Chair: Independent            Patient Vitals for the past 6 hrs:   BP BP Patient Position SpO2 Pulse   11/18/20 0739 103/73 At rest;Supine 92 % (!) 59       Mental Status  Neurologic State: Alert  Orientation Level: Oriented to person  Cognition: Follows commands, Memory loss                               Number of elements that affect the Plan of Care: 1-3:  LOW COMPLEXITY   CLINICAL DECISION MAKING:   [facility] AM-PAC 6 Clicks   Daily Activity Inpatient Short Form  How much help from another person does the patient currently need. .. Total A Lot A Little None   1. Putting on and taking off regular lower body clothing? [] 1   [] 2   [] 3   [x] 4   2. Bathing (including washing, rinsing, drying)? [] 1   [] 2   [] 3   [x] 4   3. Toileting, which includes using toilet, bedpan or urinal?   [] 1   [] 2   [] 3   [x] 4   4. Putting on and taking off regular upper body clothing? [] 1   [] 2   [] 3   [x] 4   5. Taking care of personal grooming such as brushing teeth? [] 1   [] 2   [] 3   [x] 4   6. Eating meals? [] 1   [] 2   [] 3   [x] 4   © 2007, Trustees of [facility], under license to [employer]. All rights reserved      Score:  Initial: 24 Most Recent: X (Date: -- )    Interpretation of Tool:  Represents activities that are increasingly more difficult (i.e. Bed mobility, Transfers, Gait).        Use of outcome tool(s) and clinical judgement create a POC that gives a: LOW COMPLEXITY         TREATMENT:   (In addition to Assessment/Re-Assessment sessions the following treatments were rendered)     Pre-treatment Symptoms/Complaints:    Pain: Initial:     0 Post Session:  0     Self Care: (10 min): Procedure(s) (per grid) utilized to improve and/or restore self-care/home management as related to toileting and grooming. Required minimal   cueing to facilitate activities of daily living skills.     Braces/Orthotics/Lines/Etc:   Room air  Treatment/Session Assessment:    Response to Treatment:  no adverse reaction   Interdisciplinary Collaboration:   Occupational Therapist  Registered Nurse  After treatment position/precautions:   Up in chair  Bed/Chair-wheels locked  Call light within reach  RN notified     Total Treatment Duration:  OT Patient Time In/Time Out  Time In: 0932  Time Out: Gianluca 231, OT

## 2020-11-18 NOTE — PROGRESS NOTES
MSN, CM:  Patient will be discharged home when medically stable. Patient has Irina Valdez every other Tuesday. Daughter to increase days.

## 2020-11-18 NOTE — PROGRESS NOTES
Convalescent plasma 205 mL started at 0112 and finished at 0252. Pt tolerated with no adverse reactions. Respirations even and unlabored. No changes to baseline. Pt resting in bed with call light within reach. Safety measures in place.

## 2020-11-18 NOTE — PROGRESS NOTES
This RN received PT from ANNA MIKA [facility] nurse. Pt resting at bedside chair comfortably. No new changes to baseline. LS clear ANU.

## 2020-11-18 NOTE — PROGRESS NOTES
Hospitalist Note     Admit Date:  2020  8:58 PM   Name:  Lenore Paredes   Age:  [de-identified] y.o.  :  1940   MRN:  [id number]   PCP:  Larry Nieves MD  Treatment Team: Attending Provider: Catrachito Oakes MD; Care Manager: Farida Mario RN; Utilization Review: Lilliam Mcdonald RN; Physical Therapist: Geraldo Wild DPT; Utilization Review: Dot Trejo RN    HPI/Subjective:   Lenore Paredes is a [de-identified] y.o. male with medical history significant for Alzheimer's dementia who presented from home with fever since Saturday, decreased appetite, increased confusion and hypoxia. Pt seen and examined at bedside this morning. Very hard of hearing and does not always understand my questions, but denies any fevrs, chills, n/v/d, abd pain, calf pain or swelling, loss of taste or smell. He is not eatingmuch. Does admit to purulent sputum production. No other complaints  Objective:     Patient Vitals for the past 24 hrs:   Temp Pulse Resp BP SpO2   20 0739 97.5 °F (36.4 °C) (!) 59 15 103/73 92 %   20 0252 97.5 °F (36.4 °C) 64 18 (!) 103/58 95 %   20 0130 97.2 °F (36.2 °C) 67 18 101/61 95 %   20 0127 97.1 °F (36.2 °C) 65 18 107/63 95 %   20 0112 97.1 °F (36.2 °C) 72 18 118/70 94 %   20 1915 97.8 °F (36.6 °C) 71 18 103/65 92 %   20 1458 97.9 °F (36.6 °C) 74 17 125/60 97 %   20 1418     94 %   20 1200 97.1 °F (36.2 °C) 62 17 111/61 92 %     Oxygen Therapy  O2 Sat (%): 92 % (20 0739)  Pulse via Oximetry: 55 beats per minute (20 0201)  O2 Device: Nasal cannula (20)  O2 Flow Rate (L/min): 2 l/min (20)    Estimated body mass index is 19.58 kg/m² as calculated from the following:    Height as of this encounter: 5' 7\" (1.702 m). Weight as of this encounter: 56.7 kg (125 lb).       Intake/Output Summary (Last 24 hours) at 2020 1141  Last data filed at 2020 0252  Gross per 24 hour   Intake 245 ml Output    Net 245 ml       *Note that automatically entered I/Os may not be accurate; dependent on patient compliance with collection and accurate  by techs. General:    Well nourished. Alert. CV:   RRR. No murmur, rub, or gallop. Lungs:   CTAB. No wheezing, rhonchi, or rales. Abdomen:   Soft, nontender, nondistended. Extremities: Warm and dry. No cyanosis or edema. Skin:     No rashes or jaundice. Neuro:  No gross focal deficits    Data Reviewed:  I have reviewed all labs, meds, and studies from the last 24 hours:  Recent Results (from the past 24 hour(s))   CBC WITH AUTOMATED DIFF    Collection Time: 11/18/20  2:37 AM   Result Value Ref Range    WBC 6.8 4.3 - 11.1 K/uL    RBC 4.71 4.23 - 5.6 M/uL    HGB 13.0 (L) 13.6 - 17.2 g/dL    HCT 41.0 (L) 41.1 - 50.3 %    MCV 87.0 79.6 - 97.8 FL    MCH 27.6 26.1 - 32.9 PG    MCHC 31.7 31.4 - 35.0 g/dL    RDW 13.7 11.9 - 14.6 %    PLATELET 388 (H) 436 - 450 K/uL    MPV 9.7 9.4 - 12.3 FL    ABSOLUTE NRBC 0.00 0.0 - 0.2 K/uL    DF AUTOMATED      NEUTROPHILS 84 (H) 43 - 78 %    LYMPHOCYTES 11 (L) 13 - 44 %    MONOCYTES 4 4.0 - 12.0 %    EOSINOPHILS 0 (L) 0.5 - 7.8 %    BASOPHILS 0 0.0 - 2.0 %    IMMATURE GRANULOCYTES 1 0.0 - 5.0 %    ABS. NEUTROPHILS 5.7 1.7 - 8.2 K/UL    ABS. LYMPHOCYTES 0.7 0.5 - 4.6 K/UL    ABS. MONOCYTES 0.3 0.1 - 1.3 K/UL    ABS. EOSINOPHILS 0.0 0.0 - 0.8 K/UL    ABS. BASOPHILS 0.0 0.0 - 0.2 K/UL    ABS. IMM.  GRANS. 0.1 0.0 - 0.5 K/UL   METABOLIC PANEL, COMPREHENSIVE    Collection Time: 11/18/20  2:37 AM   Result Value Ref Range    Sodium 141 136 - 145 mmol/L    Potassium 3.9 3.5 - 5.1 mmol/L    Chloride 110 (H) 98 - 107 mmol/L    CO2 23 21 - 32 mmol/L    Anion gap 8 7 - 16 mmol/L    Glucose 117 (H) 65 - 100 mg/dL    BUN 24 (H) 8 - 23 MG/DL    Creatinine 1.06 0.8 - 1.5 MG/DL    GFR est AA >60 >60 ml/min/1.73m2    GFR est non-AA >60 >60 ml/min/1.73m2    Calcium 8.0 (L) 8.3 - 10.4 MG/DL    Bilirubin, total 0.2 0.2 - 1.1 MG/DL ALT (SGPT) 12 12 - 65 U/L    AST (SGOT) 29 15 - 37 U/L    Alk. phosphatase 56 50 - 136 U/L    Protein, total 6.7 6.3 - 8.2 g/dL    Albumin 3.0 (L) 3.2 - 4.6 g/dL    Globulin 3.7 (H) 2.3 - 3.5 g/dL    A-G Ratio 0.8 (L) 1.2 - 3.5          Current Meds:  Current Facility-Administered Medications   Medication Dose Route Frequency    0.9% sodium chloride infusion 250 mL  250 mL IntraVENous PRN    sodium chloride (NS) flush 5-40 mL  5-40 mL IntraVENous Q8H    sodium chloride (NS) flush 5-40 mL  5-40 mL IntraVENous PRN    acetaminophen (TYLENOL) tablet 650 mg  650 mg Oral Q6H PRN    Or    acetaminophen (TYLENOL) suppository 650 mg  650 mg Rectal Q6H PRN    polyethylene glycol (MIRALAX) packet 17 g  17 g Oral DAILY PRN    promethazine (PHENERGAN) tablet 12.5 mg  12.5 mg Oral Q6H PRN    Or    ondansetron (ZOFRAN) injection 4 mg  4 mg IntraVENous Q6H PRN    enoxaparin (LOVENOX) injection 30 mg  30 mg SubCUTAneous Q12H    memantine (NAMENDA) tablet 10 mg  10 mg Oral QHS    donepeziL (ARICEPT) tablet 5 mg  5 mg Oral QHS    zinc sulfate tablet 220 mg  220 mg Oral DAILY    ascorbic acid (vitamin C) (VITAMIN C) tablet 1,000 mg  1,000 mg Oral BID    dexAMETHasone (DECADRON) tablet 6 mg  6 mg Oral DAILY    pantoprazole (PROTONIX) tablet 40 mg  40 mg Oral ACB    remdesivir 100 mg in 0.9% sodium chloride 250 mL IVPB  100 mg IntraVENous Q24H       Other Studies:  No results found for this visit on 11/16/20. No results found.     All Micro Results     Procedure Component Value Units Date/Time    CULTURE, BLOOD [[id number]] Collected:  11/16/20 2232    Order Status:  Completed Specimen:  Blood Updated:  11/18/20 0748     Special Requests: --        NO SPECIAL REQUESTS  LEFT  FOREARM       Culture result: NO GROWTH 2 DAYS       CULTURE, BLOOD [[id number]] Collected:  11/16/20 2117    Order Status:  Completed Specimen:  Blood Updated:  11/18/20 0748     Special Requests: --        NO SPECIAL REQUESTS  LEFT  FOREARM Culture result: NO GROWTH 2 DAYS             SARS-CoV-2 Lab Results  \"Novel Coronavirus\" Test: No results found for: COV2NT   \"Emergent Disease\" Test: No results found for: EDPR  \"SARS-COV-2\" Test: No results found for: XGCOVT  Rapid Test:   Lab Results   Component Value Date/Time    COVR Detected (AA) 11/16/2020 10:41 PM            Assessment and Plan:     Hospital Problems as of 11/18/2020 Date Reviewed: 2/28/2017          Codes Class Noted - Resolved POA    * (Principal) Acute respiratory failure with hypoxia ([facility] ICD-10-CM: J96.01  ICD-9-CM: 518.81  11/17/2020 - Present Unknown        Pneumonia due to COVID-19 virus ICD-10-CM: U07.1, J12.89  ICD-9-CM: 480.8  11/17/2020 - Present Unknown        Alzheimer disease ([facility] ICD-10-CM: G30.9, F02.80  ICD-9-CM: 331.0  11/17/2020 - Present Unknown              Plan:  Acute Respiratory Failure with Hypoxia  COVID 19 Pneumonia  -O2 supplement as needed and wean as toleraetd  -convalescent plasma given 11/17  -decadron (day 2/10) + zinc/Vitamin C  -ID for remdesivir (day 2/5)  -lovenox for DVT PPx , D-Dimer elevated to 0.55, cont to monitor  -procal 0.11, will hold Abx for now     Alzheimer Disease: continue with home medication. DC planning/Dispo:  Pending further clinical course and finished remdesivir treament.     Diet:  DIET REGULAR  DVT ppx:  Lovenox    Signed:  Saba Noriega MD

## 2020-11-18 NOTE — PROGRESS NOTES
Tried to contact pt daughter Abby Quintanilla for consent for convalescent plasma, no answer. Spoke with other daughter Lianna Campbell. She gave consent to pt having plasma. Fatimah NOGUERA witnessed. Updated daughter plan of care.

## 2020-11-18 NOTE — PROGRESS NOTES
Problem: Mobility Impaired (Adult and Pediatric)  Goal: *Acute Goals and Plan of Care (Insert Text)  Description: LTG:  (1.)Mr. Segovia will move from supine to sit and sit to supine, scoot up and down, and roll side to side INDEPENDENT within 7 treatment day(s). (2.)Mr. Segovia will transfer from bed to chair and chair to bed INDEPENDENTLY within 7 treatment day(s). (3.)Mr. Segovia will ambulate INDEPENDENTLY for 200+ feet within 7 treatment day(s). (4.)Mr. Segovia will perform seated and standing exercises for 15+ minutes to improve strength and mobility within 7 days. ________________________________________________________________________________________________     Outcome: Progressing Towards Goal     PHYSICAL THERAPY: Initial Assessment and AM 11/18/2020  INPATIENT: PT Visit Days : 1  Payor: Edi Duque / Plan: [address] / Product Type: Managed Care Medicare /       NAME/AGE/GENDER: William Mckoy is a [de-identified] y.o. male   PRIMARY DIAGNOSIS: Acute respiratory failure with hypoxia ([facility] [J96.01]  Pneumonia due to COVID-19 virus [U07.1, J12.89] Acute respiratory failure with hypoxia ([address] Acute respiratory failure with hypoxia ([address]        ICD-10: Treatment Diagnosis:    Generalized Muscle Weakness (M62.81)  Other abnormalities of gait and mobility (R26.89)   Precaution/Allergies:  Patient has no known allergies. ASSESSMENT:     Mr. Nichelle Olivera is a very pleasant [de-identified]year old male with history of Alzheimer's disease who is admitted from his senior care community with respiratory failure and hypoxia due to COVID 19 virus infection. He is typically independent and active per chart review, walks for exercises and denies DME use. Pt presents sitting up in chair fully dressed and on room air. Performs transfers with supervision-independence. Ambulatory in room x several laps with supervision and min verbal cues for gait safety.  Took seated rest break, then performs standing activities with supervision at sink. Ambulates a few more laps after a last rest break. SpO2 on room air briefly drops to 89% during activity, then quickly back to low 90's. Ravi Roque is functioning only slightly below baseline physically with balance, strength, and activity tolerance. Cognition seems to be biggest impairment however this may be baseline. Darek needs TBD- hopefully can return to his apartment as long as this is safe from cognitive standpoint. This section established at most recent assessment   PROBLEM LIST (Impairments causing functional limitations):  Decreased Strength  Decreased Balance  Decreased Activity Tolerance  Decreased Pacing Skills   INTERVENTIONS PLANNED: (Benefits and precautions of physical therapy have been discussed with the patient.)  Balance Exercise  Bed Mobility  Gait Training  Home Exercise Program (HEP)  Therapeutic Activites  Therapeutic Exercise/Strengthening  Transfer Training     TREATMENT PLAN: Frequency/Duration: 3 times a week for duration of hospital stay  Rehabilitation Potential For Stated Goals: Excellent     REHAB RECOMMENDATIONS (at time of discharge pending progress):    Placement: It is my opinion, based on this patient's performance to date, that Mr. Garret Johnson may benefit from participating in 1-2 additional therapy sessions in order to continue to assess for rehab potential and then make recommendation for disposition at discharge. HH PT vs no needs? Equipment:   None at this time              HISTORY:   History of Present Injury/Illness (Reason for Referral):  Per H&P, \"William Fraser is a [de-identified] y.o. male with medical history significant for Alzheimer's dementia who presented from home with fever since Saturday, decreased appetite, increased confusion and hypoxia today. And is alert and oriented x3 but very hard of hearing. Provided by his daughter who was at bedside. Patient lives independently in his own apt and walks daily without any issues.  He was found to have some confusions over the weekend and noted to have febrile episodes with Tmax of 102.7F. He was also noted to have decreased PO intake. Patient was taken to the Emergency MD today and to be hypotensive with blood pressure 92/50 and saturation low 90s. ED for further work-up and management. CXR at Emergency MD showed bilateral infiltrate suspicious for covid19 infection. In the ED, patient was noted to be 88% on room air with saturation improving to 97% on 2 L nasal cannula. He is afebrile with BP of 101/54. CXR shows new hazy bilateral lung infiltrates. Labs are unremarkable. \"    Past Medical History/Comorbidities:   Mr. Feng Granados  has a past medical history of Hematuria, microscopic, Memory loss, and PUD (peptic ulcer disease). He also has no past medical history of Adverse effect of anesthesia, Diabetes ([address] Difficult intubation, Malignant hyperthermia due to anesthesia, Nausea & vomiting, or Pseudocholinesterase deficiency. Mr. Feng Granados  has a past surgical history that includes pr appendectomy and hx hernia repair. Social History/Living Environment:   Home Environment: Apartment  Living Alone: Yes  Support Systems: Child(joshua), Family member(s)  Patient Expects to be Discharged to[de-identified] Private residence  Current DME Used/Available at Home: None  Prior Level of Function/Work/Activity:  Lives alone. Ambulatory without DME.       Number of Personal Factors/Comorbidities that affect the Plan of Care: 1-2: MODERATE COMPLEXITY   EXAMINATION:   Most Recent Physical Functioning:   Gross Assessment:  AROM: Within functional limits  Strength: Generally decreased, functional  Coordination: Within functional limits               Posture:  Posture (WDL): Within defined limits  Balance:  Sitting: Intact  Standing: Intact Bed Mobility:     Wheelchair Mobility:     Transfers:  Sit to Stand: Supervision  Stand to Sit: Supervision  Bed to Chair: Supervision  Gait:     Base of Support: Narrowed  Speed/Rosa: Fluctuations  Gait Abnormalities: Trunk sway increased  Distance (ft): 90 Feet (ft)  Ambulation - Level of Assistance: Supervision  Interventions: Verbal cues      Body Structures Involved:  Lungs  Muscles Body Functions Affected:  Mental  Respiratory  Movement Related Activities and Participation Affected:  General Tasks and Demands  Mobility  Domestic Life  Community, Social and [place]   Number of elements that affect the Plan of Care: 4+: HIGH COMPLEXITY   CLINICAL PRESENTATION:   Presentation: Stable and uncomplicated: LOW COMPLEXITY   CLINICAL DECISION MAKING:   [place] Form  How much difficulty does the patient currently have. .. Unable A Lot A Little None   1. Turning over in bed (including adjusting bedclothes, sheets and blankets)? [] 1   [] 2   [] 3   [x] 4   2. Sitting down on and standing up from a chair with arms ( e.g., wheelchair, bedside commode, etc.)   [] 1   [] 2   [] 3   [x] 4   3. Moving from lying on back to sitting on the side of the bed? [] 1   [] 2   [] 3   [x] 4   How much help from another person does the patient currently need. .. Total A Lot A Little None   4. Moving to and from a bed to a chair (including a wheelchair)? [] 1   [] 2   [x] 3   [] 4   5. Need to walk in hospital room? [] 1   [] 2   [x] 3   [] 4   6. Climbing 3-5 steps with a railing? [] 1   [] 2   [x] 3   [] 4   © 2007, Trustees of [address] [place] [address], under license to [employer]. All rights reserved      Score:  Initial: 21 Most Recent: X (Date: -- )    Interpretation of Tool:  Represents activities that are increasingly more difficult (i.e. Bed mobility, Transfers, Gait). Medical Necessity:     Patient demonstrates   good   rehab potential due to higher previous functional level.   Reason for Services/Other Comments:  Patient   continues to demonstrate capacity to improve strength, balance, and activity tolerance which will   increase independence and increase safety  . Use of outcome tool(s) and clinical judgement create a POC that gives a: Clear prediction of patient's progress: LOW COMPLEXITY            TREATMENT:   (In addition to Assessment/Re-Assessment sessions the following treatments were rendered)   Pre-treatment Symptoms/Complaints:  talkative, pleasantly confused  Pain: Initial:   Pain Intensity 1: 0  Post Session:  0/10     Therapeutic Exercise: (9 Minutes):  mobility, transfers, ambulation x several laps and two trials, and standing functional activities in room to improve mobility, strength, balance, and activity tolerance . Required minimal verbal cues to promote proper body posture, promote proper body mechanics, and promote proper body breathing techniques. Progressed complexity of movement and gait distance/activity duration  as indicated. Braces/Orthotics/Lines/Etc:   IV  O2 Device: Room air  Treatment/Session Assessment:    Response to Treatment:  supervision for ambulation in room   Interdisciplinary Collaboration:   Physical Therapist  Registered Nurse  After treatment position/precautions:   Up in chair  Bed/Chair-wheels locked  Bed in low position  Call light within reach  RN notified   Compliance with Program/Exercises: Will assess as treatment progresses  Recommendations/Intent for next treatment session: \"Next visit will focus on advancements to more challenging activities and reduction in assistance provided\".   Total Treatment Duration:  PT Patient Time In/Time Out  Time In: 1126  Time Out: [address] [facility], [facility]

## 2020-11-19 LAB
ALBUMIN SERPL-MCNC: 2.5 G/DL (ref 3.2–4.6)
ALBUMIN/GLOB SERPL: 0.7 {RATIO} (ref 1.2–3.5)
ALP SERPL-CCNC: 52 U/L (ref 50–136)
ALT SERPL-CCNC: 13 U/L (ref 12–65)
ANION GAP SERPL CALC-SCNC: 13 MMOL/L (ref 7–16)
AST SERPL-CCNC: 30 U/L (ref 15–37)
BASOPHILS # BLD: 0 K/UL (ref 0–0.2)
BASOPHILS NFR BLD: 0 % (ref 0–2)
BILIRUB SERPL-MCNC: 0.2 MG/DL (ref 0.2–1.1)
BLD PROD TYP BPU: NORMAL
BPU ID: NORMAL
BUN SERPL-MCNC: 25 MG/DL (ref 8–23)
CALCIUM SERPL-MCNC: 7.9 MG/DL (ref 8.3–10.4)
CHLORIDE SERPL-SCNC: 116 MMOL/L (ref 98–107)
CO2 SERPL-SCNC: 16 MMOL/L (ref 21–32)
CREAT SERPL-MCNC: 1.03 MG/DL (ref 0.8–1.5)
DIFFERENTIAL METHOD BLD: ABNORMAL
EOSINOPHIL # BLD: 0 K/UL (ref 0–0.8)
EOSINOPHIL NFR BLD: 0 % (ref 0.5–7.8)
ERYTHROCYTE [DISTWIDTH] IN BLOOD BY AUTOMATED COUNT: 14.2 % (ref 11.9–14.6)
GLOBULIN SER CALC-MCNC: 3.5 G/DL (ref 2.3–3.5)
GLUCOSE SERPL-MCNC: 117 MG/DL (ref 65–100)
HCT VFR BLD AUTO: 40.4 % (ref 41.1–50.3)
HGB BLD-MCNC: 12.8 G/DL (ref 13.6–17.2)
IMM GRANULOCYTES # BLD AUTO: 0.1 K/UL (ref 0–0.5)
IMM GRANULOCYTES NFR BLD AUTO: 1 % (ref 0–5)
LYMPHOCYTES # BLD: 0.8 K/UL (ref 0.5–4.6)
LYMPHOCYTES NFR BLD: 9 % (ref 13–44)
MCH RBC QN AUTO: 28.2 PG (ref 26.1–32.9)
MCHC RBC AUTO-ENTMCNC: 31.7 G/DL (ref 31.4–35)
MCV RBC AUTO: 89 FL (ref 79.6–97.8)
MONOCYTES # BLD: 0.5 K/UL (ref 0.1–1.3)
MONOCYTES NFR BLD: 5 % (ref 4–12)
NEUTS SEG # BLD: 7.9 K/UL (ref 1.7–8.2)
NEUTS SEG NFR BLD: 85 % (ref 43–78)
NRBC # BLD: 0 K/UL (ref 0–0.2)
PLATELET # BLD AUTO: 369 K/UL (ref 150–450)
PLATELET COMMENTS,PCOM: ADEQUATE
PMV BLD AUTO: 10.6 FL (ref 9.4–12.3)
POTASSIUM SERPL-SCNC: 4.2 MMOL/L (ref 3.5–5.1)
PROT SERPL-MCNC: 6 G/DL (ref 6.3–8.2)
RBC # BLD AUTO: 4.54 M/UL (ref 4.23–5.6)
RBC MORPH BLD: ABNORMAL
SODIUM SERPL-SCNC: 145 MMOL/L (ref 136–145)
STATUS OF UNIT,%ST: NORMAL
UNIT DIVISION, %UDIV: NORMAL
WBC # BLD AUTO: 9.3 K/UL (ref 4.3–11.1)
WBC MORPH BLD: ABNORMAL

## 2020-11-19 PROCEDURE — 97530 THERAPEUTIC ACTIVITIES: CPT

## 2020-11-19 PROCEDURE — 85025 COMPLETE CBC W/AUTO DIFF WBC: CPT

## 2020-11-19 PROCEDURE — 65270000029 HC RM PRIVATE

## 2020-11-19 PROCEDURE — 80053 COMPREHEN METABOLIC PANEL: CPT

## 2020-11-19 PROCEDURE — 74011250637 HC RX REV CODE- 250/637: Performed by: INTERNAL MEDICINE

## 2020-11-19 PROCEDURE — 74011000250 HC RX REV CODE- 250: Performed by: INTERNAL MEDICINE

## 2020-11-19 PROCEDURE — 74011250637 HC RX REV CODE- 250/637: Performed by: STUDENT IN AN ORGANIZED HEALTH CARE EDUCATION/TRAINING PROGRAM

## 2020-11-19 PROCEDURE — 74011250636 HC RX REV CODE- 250/636: Performed by: INTERNAL MEDICINE

## 2020-11-19 PROCEDURE — 74011000258 HC RX REV CODE- 258: Performed by: INTERNAL MEDICINE

## 2020-11-19 RX ORDER — HALOPERIDOL 1 MG/1
1 TABLET ORAL ONCE
Status: COMPLETED | OUTPATIENT
Start: 2020-11-19 | End: 2020-11-19

## 2020-11-19 RX ADMIN — OXYCODONE HYDROCHLORIDE AND ACETAMINOPHEN 1000 MG: 500 TABLET ORAL at 08:47

## 2020-11-19 RX ADMIN — DEXAMETHASONE 6 MG: 4 TABLET ORAL at 08:47

## 2020-11-19 RX ADMIN — DONEPEZIL HYDROCHLORIDE 5 MG: 5 TABLET, FILM COATED ORAL at 21:45

## 2020-11-19 RX ADMIN — HALOPERIDOL 1 MG: 1 TABLET ORAL at 15:56

## 2020-11-19 RX ADMIN — PANTOPRAZOLE SODIUM 40 MG: 40 TABLET, DELAYED RELEASE ORAL at 08:47

## 2020-11-19 RX ADMIN — MEMANTINE HYDROCHLORIDE 10 MG: 5 TABLET ORAL at 21:45

## 2020-11-19 RX ADMIN — Medication 10 ML: at 05:52

## 2020-11-19 RX ADMIN — Medication 5 ML: at 13:14

## 2020-11-19 RX ADMIN — Medication 10 ML: at 21:46

## 2020-11-19 RX ADMIN — OXYCODONE HYDROCHLORIDE AND ACETAMINOPHEN 1000 MG: 500 TABLET ORAL at 17:06

## 2020-11-19 RX ADMIN — Medication 220 MG: at 08:47

## 2020-11-19 RX ADMIN — REMDESIVIR 100 MG: 100 INJECTION, POWDER, LYOPHILIZED, FOR SOLUTION INTRAVENOUS at 11:40

## 2020-11-19 RX ADMIN — ENOXAPARIN SODIUM 30 MG: 40 INJECTION SUBCUTANEOUS at 21:45

## 2020-11-19 RX ADMIN — ENOXAPARIN SODIUM 30 MG: 40 INJECTION SUBCUTANEOUS at 08:48

## 2020-11-19 NOTE — PROGRESS NOTES
Problem: Mobility Impaired (Adult and Pediatric)  Goal: *Acute Goals and Plan of Care (Insert Text)  Description: LTG:  (1.)Mr. Segovia will move from supine to sit and sit to supine, scoot up and down, and roll side to side INDEPENDENT within 7 treatment day(s). (2.)Mr. Segovia will transfer from bed to chair and chair to bed INDEPENDENTLY within 7 treatment day(s). (3.)Mr. Segovia will ambulate INDEPENDENTLY for 200+ feet within 7 treatment day(s). (4.)Mr. Segovia will perform seated and standing exercises for 15+ minutes to improve strength and mobility within 7 days. ________________________________________________________________________________________________     Outcome: Progressing Towards Goal     PHYSICAL THERAPY: Daily Note and AM 11/19/2020  INPATIENT: PT Visit Days : 2  Payor: [place] Check / Plan: [address] / Product Type: [employer] /       NAME/AGE/GENDER: Yen Limon is a [de-identified] y.o. male   PRIMARY DIAGNOSIS: Acute respiratory failure with hypoxia ([facility] [J96.01]  Pneumonia due to COVID-19 virus [U07.1, J12.89] Acute respiratory failure with hypoxia ([address] Acute respiratory failure with hypoxia ([address]       ICD-10: Treatment Diagnosis:    · Generalized Muscle Weakness (M62.81)  · Other abnormalities of gait and mobility (R26.89)   Precaution/Allergies:  Patient has no known allergies. ASSESSMENT:     Mr. Arturo Shah is a very pleasant [de-identified]year old male with history of Alzheimer's disease who is admitted from his senior care community with respiratory failure and hypoxia due to COVID 19 virus infection. He is typically independent and active per chart review, walks for exercises and denies DME use. Pt presents sitting up in chair fully dressed and on room air. Performs transfers with supervision-independence. Ambulatory in room x several laps with supervision and min verbal cues for gait safety.  SpO2 fluctuated between 88-92% on room air this AM. Elfego Can is functioning only slightly below baseline physically with balance, strength, and activity tolerance. Cognition seems to be biggest impairment however this may be baseline. Dc needs TBD- hopefully can return to his apartment as long as this is safe from cognitive standpoint. This section established at most recent assessment   PROBLEM LIST (Impairments causing functional limitations):  1. Decreased Strength  2. Decreased Balance  3. Decreased Activity Tolerance  4. Decreased Pacing Skills   INTERVENTIONS PLANNED: (Benefits and precautions of physical therapy have been discussed with the patient.)  1. Balance Exercise  2. Bed Mobility  3. Gait Training  4. Home Exercise Program (HEP)  5. Therapeutic Activites  6. Therapeutic Exercise/Strengthening  7. Transfer Training     TREATMENT PLAN: Frequency/Duration: 3 times a week for duration of hospital stay  Rehabilitation Potential For Stated Goals: Excellent     REHAB RECOMMENDATIONS (at time of discharge pending progress):    Placement: It is my opinion, based on this patient's performance to date, that Mr. Bella Gerardo may benefit from participating in 1-2 additional therapy sessions in order to continue to assess for rehab potential and then make recommendation for disposition at discharge. HH PT vs no needs? Equipment:    None at this time              HISTORY:   History of Present Injury/Illness (Reason for Referral):  Per H&P, \"William Anderson is a [de-identified] y.o. male with medical history significant for Alzheimer's dementia who presented from home with fever since Saturday, decreased appetite, increased confusion and hypoxia today. And is alert and oriented x3 but very hard of hearing. Provided by his daughter who was at bedside. Patient lives independently in his own apt and walks daily without any issues. He was found to have some confusions over the weekend and noted to have febrile episodes with Tmax of 102.7F.   He was also noted to have decreased PO intake. Patient was taken to the Emergency MD today and to be hypotensive with blood pressure 92/50 and saturation low 90s. ED for further work-up and management. CXR at Emergency MD showed bilateral infiltrate suspicious for covid19 infection. In the ED, patient was noted to be 88% on room air with saturation improving to 97% on 2 L nasal cannula. He is afebrile with BP of 101/54. CXR shows new hazy bilateral lung infiltrates. Labs are unremarkable. \"    Past Medical History/Comorbidities:   Mr. Alana Spain  has a past medical history of Hematuria, microscopic, Memory loss, and PUD (peptic ulcer disease). He also has no past medical history of Adverse effect of anesthesia, Diabetes ([address] Difficult intubation, Malignant hyperthermia due to anesthesia, Nausea & vomiting, or Pseudocholinesterase deficiency. Mr. Alana Spain  has a past surgical history that includes pr appendectomy and hx hernia repair. Social History/Living Environment:   Home Environment: Apartment  Living Alone: Yes  Support Systems: Child(joshua), Family member(s)  Patient Expects to be Discharged to[de-identified] Private residence  Current DME Used/Available at Home: None  Prior Level of Function/Work/Activity:  Lives alone. Ambulatory without DME. Number of Personal Factors/Comorbidities that affect the Plan of Care: 1-2: MODERATE COMPLEXITY   EXAMINATION:   Most Recent Physical Functioning:   Gross Assessment:                  Posture:     Balance:  Sitting: Intact  Standing: Intact Bed Mobility:     Wheelchair Mobility:     Transfers:  Sit to Stand: Supervision  Stand to Sit: Supervision  Bed to Chair: Supervision  Gait:     Base of Support: Narrowed  Gait Abnormalities: Trunk sway increased  Distance (ft): 120 Feet (ft)  Ambulation - Level of Assistance: Supervision  Interventions: Verbal cues      Body Structures Involved:  1. Lungs  2. Muscles Body Functions Affected:  1. Mental  2. Respiratory  3.  Movement Related Activities and Participation Affected:  1. General Tasks and Demands  2. Mobility  3. Domestic Life  4. Community, Social and [place]   Number of elements that affect the Plan of Care: 4+: HIGH COMPLEXITY   CLINICAL PRESENTATION:   Presentation: Stable and uncomplicated: LOW COMPLEXITY   CLINICAL DECISION MAKIN [facility] Mobility Inpatient Short Form  How much difficulty does the patient currently have. .. Unable A Lot A Little None   1. Turning over in bed (including adjusting bedclothes, sheets and blankets)? [] 1   [] 2   [] 3   [x] 4   2. Sitting down on and standing up from a chair with arms ( e.g., wheelchair, bedside commode, etc.)   [] 1   [] 2   [] 3   [x] 4   3. Moving from lying on back to sitting on the side of the bed? [] 1   [] 2   [] 3   [x] 4   How much help from another person does the patient currently need. .. Total A Lot A Little None   4. Moving to and from a bed to a chair (including a wheelchair)? [] 1   [] 2   [x] 3   [] 4   5. Need to walk in hospital room? [] 1   [] 2   [x] 3   [] 4   6. Climbing 3-5 steps with a railing? [] 1   [] 2   [x] 3   [] 4   © , Trustees of [address] [place] [address], under license to [employer]. All rights reserved      Score:  Initial: 21 Most Recent: X (Date: -- )    Interpretation of Tool:  Represents activities that are increasingly more difficult (i.e. Bed mobility, Transfers, Gait). Medical Necessity:     · Patient demonstrates   · good  ·  rehab potential due to higher previous functional level. Reason for Services/Other Comments:  · Patient   · continues to demonstrate capacity to improve strength, balance, and activity tolerance which will   · increase independence and increase safety  · .    Use of outcome tool(s) and clinical judgement create a POC that gives a: Clear prediction of patient's progress: LOW COMPLEXITY            TREATMENT:   (In addition to Assessment/Re-Assessment sessions the following treatments were rendered)   Pre-treatment Symptoms/Complaints:  talkative, pleasantly confused  Pain: Initial:   Pain Intensity 1: 0  Post Session:  0/10     Therapeutic Activity (10 Minutes): Therapeutic activity included Transfer Training, Ambulation on level ground, Standing balance and sit to stand transfers x5 to improve functional Mobility, Strength and Activity tolerance. Braces/Orthotics/Lines/Etc:   · IV  · O2 Device: Room air  Treatment/Session Assessment:    · Response to Treatment:  supervision for ambulation in room. Desatting some with mobility. · Interdisciplinary Collaboration:   o Physical Therapist  o Registered Nurse  · After treatment position/precautions:   o Up in chair  o Bed/Chair-wheels locked  o Bed in low position  o Call light within reach  o RN notified   · Compliance with Program/Exercises: Will assess as treatment progresses  · Recommendations/Intent for next treatment session: \"Next visit will focus on advancements to more challenging activities and reduction in assistance provided\".   Total Treatment Duration:  PT Patient Time In/Time Out  Time In: 0932  Time Out: [address], PT, DPT

## 2020-11-19 NOTE — PROGRESS NOTES
Hospitalist Note     Admit Date:  2020  8:58 PM   Name:  William Mckoy   Age:  [de-identified] y.o.  :  1940   MRN:  [id number]   PCP:  Luis Nguyen MD  Treatment Team: Attending Provider: Svetlana Alexander MD; Care Manager: Aly Campos, RN; Utilization Review: Jessika Middleton, RN; Utilization Review: Edna Bauman, RN; Physical Therapist: Helga Hahn, PT, DPT    HPI/Subjective:   William Mckoy is a [de-identified] y.o. male with medical history significant for Alzheimer's dementia who presented from home with fever since Saturday, decreased appetite, increased confusion and hypoxia. Pt seen and examined at bedside this morning. Very hard of hearing and does not always understand my questions, but denies any fevrs, chills, SOB, JOHN, n/v/d, abd pain, calf pain or swelling. Pt up walking around the room upon my exam this morning. States he is doing well without any complaints. No other complaints  Objective:     Patient Vitals for the past 24 hrs:   Temp Pulse Resp BP SpO2   20 0740 97.9 °F (36.6 °C) (!) 55 18 136/68 92 %   20 0411  68 16 126/60 92 %   20 0015 98 °F (36.7 °C) 62 18 (!) 143/74 93 %   20 98 °F (36.7 °C) 81 20 135/82    20 1633 97.5 °F (36.4 °C) 79 18 122/67 93 %   20 1200 97.1 °F (36.2 °C) 93 16 105/62 91 %   20 1146     90 %     Oxygen Therapy  O2 Sat (%): 92 % (2040)  Pulse via Oximetry: 55 beats per minute (20 020)  O2 Device: Room air (20)  O2 Flow Rate (L/min): 2 l/min (20)  ETCO2 (mmHg): 94 mmHg (20)    Estimated body mass index is 19.58 kg/m² as calculated from the following:    Height as of this encounter: 5' 7\" (1.702 m). Weight as of this encounter: 56.7 kg (125 lb).       Intake/Output Summary (Last 24 hours) at 2020 1010  Last data filed at 2020 1437  Gross per 24 hour   Intake 500 ml   Output 250 ml   Net 250 ml       *Note that automatically entered I/Os may not be accurate; dependent on patient compliance with collection and accurate  by techs. General:    Well nourished. Alert. CV:   RRR. No murmur, rub, or gallop. Lungs:   CTAB. No wheezing, rhonchi, or rales. Abdomen:   Soft, nontender, nondistended. Extremities: Warm and dry. No cyanosis or edema. Skin:     No rashes or jaundice. Neuro:  No gross focal deficits    Data Reviewed:  I have reviewed all labs, meds, and studies from the last 24 hours:  Recent Results (from the past 24 hour(s))   CBC WITH AUTOMATED DIFF    Collection Time: 11/19/20  3:23 AM   Result Value Ref Range    WBC 9.3 4.3 - 11.1 K/uL    RBC 4.54 4.23 - 5.6 M/uL    HGB 12.8 (L) 13.6 - 17.2 g/dL    HCT 40.4 (L) 41.1 - 50.3 %    MCV 89.0 79.6 - 97.8 FL    MCH 28.2 26.1 - 32.9 PG    MCHC 31.7 31.4 - 35.0 g/dL    RDW 14.2 11.9 - 14.6 %    PLATELET 812 302 - 181 K/uL    MPV 10.6 9.4 - 12.3 FL    ABSOLUTE NRBC 0.00 0.0 - 0.2 K/uL    NEUTROPHILS 85 (H) 43 - 78 %    LYMPHOCYTES 9 (L) 13 - 44 %    MONOCYTES 5 4.0 - 12.0 %    EOSINOPHILS 0 (L) 0.5 - 7.8 %    BASOPHILS 0 0.0 - 2.0 %    IMMATURE GRANULOCYTES 1 0.0 - 5.0 %    ABS. NEUTROPHILS 7.9 1.7 - 8.2 K/UL    ABS. LYMPHOCYTES 0.8 0.5 - 4.6 K/UL    ABS. MONOCYTES 0.5 0.1 - 1.3 K/UL    ABS. EOSINOPHILS 0.0 0.0 - 0.8 K/UL    ABS. BASOPHILS 0.0 0.0 - 0.2 K/UL    ABS. IMM.  GRANS. 0.1 0.0 - 0.5 K/UL    RBC COMMENTS NORMOCYTIC/NORMOCHROMIC      WBC COMMENTS Result Confirmed By Smear      PLATELET COMMENTS ADEQUATE      DF AUTOMATED     METABOLIC PANEL, COMPREHENSIVE    Collection Time: 11/19/20  3:23 AM   Result Value Ref Range    Sodium 145 136 - 145 mmol/L    Potassium 4.2 3.5 - 5.1 mmol/L    Chloride 116 (H) 98 - 107 mmol/L    CO2 16 (L) 21 - 32 mmol/L    Anion gap 13 7 - 16 mmol/L    Glucose 117 (H) 65 - 100 mg/dL    BUN 25 (H) 8 - 23 MG/DL    Creatinine 1.03 0.8 - 1.5 MG/DL    GFR est AA >60 >60 ml/min/1.73m2    GFR est non-AA >60 >60 ml/min/1.73m2    Calcium 7.9 (L) 8.3 - 10.4 MG/DL    Bilirubin, total 0.2 0.2 - 1.1 MG/DL    ALT (SGPT) 13 12 - 65 U/L    AST (SGOT) 30 15 - 37 U/L    Alk. phosphatase 52 50 - 136 U/L    Protein, total 6.0 (L) 6.3 - 8.2 g/dL    Albumin 2.5 (L) 3.2 - 4.6 g/dL    Globulin 3.5 2.3 - 3.5 g/dL    A-G Ratio 0.7 (L) 1.2 - 3.5          Current Meds:  Current Facility-Administered Medications   Medication Dose Route Frequency    0.9% sodium chloride infusion 250 mL  250 mL IntraVENous PRN    sodium chloride (NS) flush 5-40 mL  5-40 mL IntraVENous Q8H    sodium chloride (NS) flush 5-40 mL  5-40 mL IntraVENous PRN    acetaminophen (TYLENOL) tablet 650 mg  650 mg Oral Q6H PRN    Or    acetaminophen (TYLENOL) suppository 650 mg  650 mg Rectal Q6H PRN    polyethylene glycol (MIRALAX) packet 17 g  17 g Oral DAILY PRN    promethazine (PHENERGAN) tablet 12.5 mg  12.5 mg Oral Q6H PRN    Or    ondansetron (ZOFRAN) injection 4 mg  4 mg IntraVENous Q6H PRN    enoxaparin (LOVENOX) injection 30 mg  30 mg SubCUTAneous Q12H    memantine (NAMENDA) tablet 10 mg  10 mg Oral QHS    donepeziL (ARICEPT) tablet 5 mg  5 mg Oral QHS    zinc sulfate tablet 220 mg  220 mg Oral DAILY    ascorbic acid (vitamin C) (VITAMIN C) tablet 1,000 mg  1,000 mg Oral BID    dexAMETHasone (DECADRON) tablet 6 mg  6 mg Oral DAILY    pantoprazole (PROTONIX) tablet 40 mg  40 mg Oral ACB    remdesivir 100 mg in 0.9% sodium chloride 250 mL IVPB  100 mg IntraVENous Q24H       Other Studies:  No results found for this visit on 11/16/20. No results found.     All Micro Results     Procedure Component Value Units Date/Time    CULTURE, BLOOD [[id number]] Collected:  11/16/20 1774    Order Status:  Completed Specimen:  Blood Updated:  11/19/20 7663     Special Requests: --        NO SPECIAL REQUESTS  LEFT  FOREARM       Culture result: NO GROWTH 3 DAYS       CULTURE, BLOOD [[id number]] Collected:  11/16/20 2117    Order Status:  Completed Specimen:  Blood Updated:  11/19/20 0735     Special Requests: --        NO SPECIAL REQUESTS  LEFT  FOREARM       Culture result: NO GROWTH 3 DAYS             SARS-CoV-2 Lab Results  \"Novel Coronavirus\" Test: No results found for: COV2NT   \"Emergent Disease\" Test: No results found for: EDPR  \"SARS-COV-2\" Test: No results found for: XGCOVT  Rapid Test:   Lab Results   Component Value Date/Time    COVR Detected (AA) 11/16/2020 10:41 PM            Assessment and Plan:     Hospital Problems as of 11/19/2020 Date Reviewed: 2/28/2017          Codes Class Noted - Resolved POA    * (Principal) Acute respiratory failure with hypoxia ([facility] ICD-10-CM: J96.01  ICD-9-CM: 518.81  11/17/2020 - Present Unknown        Pneumonia due to COVID-19 virus ICD-10-CM: U07.1, J12.89  ICD-9-CM: 480.8  11/17/2020 - Present Unknown        Alzheimer disease ([facility] ICD-10-CM: G30.9, F02.80  ICD-9-CM: 331.0  11/17/2020 - Present Unknown              Plan:  Acute Respiratory Failure with Hypoxia  COVID 19 Pneumonia  -O2 supplement as needed and wean as tolerated, now off oxygen today   -convalescent plasma given 11/17  -decadron (day 3/10) + zinc/Vitamin C  -ID for remdesivir (day 3/5)  -lovenox for DVT PPx , D-Dimer elevated to 0.55, cont to monitor  -procal 0.11, will hold Abx for now with patient improving on current regimen     Alzheimer Disease: continue with home medication.     DC planning/Dispo:  Pending further clinical course and finished remdesivir treament, can likely dc back home with home health,    Diet:  DIET REGULAR  DVT ppx:  Lovenox    Signed:  Jolene Cortez MD

## 2020-11-20 LAB
ALBUMIN SERPL-MCNC: 2.7 G/DL (ref 3.2–4.6)
ALBUMIN/GLOB SERPL: 0.9 {RATIO} (ref 1.2–3.5)
ALP SERPL-CCNC: 47 U/L (ref 50–136)
ALT SERPL-CCNC: 15 U/L (ref 12–65)
ANION GAP SERPL CALC-SCNC: 5 MMOL/L (ref 7–16)
AST SERPL-CCNC: 33 U/L (ref 15–37)
BASOPHILS # BLD: 0 K/UL (ref 0–0.2)
BASOPHILS NFR BLD: 0 % (ref 0–2)
BILIRUB SERPL-MCNC: 0.3 MG/DL (ref 0.2–1.1)
BUN SERPL-MCNC: 27 MG/DL (ref 8–23)
CALCIUM SERPL-MCNC: 7.9 MG/DL (ref 8.3–10.4)
CHLORIDE SERPL-SCNC: 117 MMOL/L (ref 98–107)
CO2 SERPL-SCNC: 26 MMOL/L (ref 21–32)
CREAT SERPL-MCNC: 1.12 MG/DL (ref 0.8–1.5)
DIFFERENTIAL METHOD BLD: ABNORMAL
EOSINOPHIL # BLD: 0 K/UL (ref 0–0.8)
EOSINOPHIL NFR BLD: 0 % (ref 0.5–7.8)
ERYTHROCYTE [DISTWIDTH] IN BLOOD BY AUTOMATED COUNT: 14.4 % (ref 11.9–14.6)
GLOBULIN SER CALC-MCNC: 3 G/DL (ref 2.3–3.5)
GLUCOSE SERPL-MCNC: 85 MG/DL (ref 65–100)
HCT VFR BLD AUTO: 37 % (ref 41.1–50.3)
HGB BLD-MCNC: 12.4 G/DL (ref 13.6–17.2)
IMM GRANULOCYTES # BLD AUTO: 0.1 K/UL (ref 0–0.5)
IMM GRANULOCYTES NFR BLD AUTO: 1 % (ref 0–5)
LYMPHOCYTES # BLD: 1 K/UL (ref 0.5–4.6)
LYMPHOCYTES NFR BLD: 13 % (ref 13–44)
MCH RBC QN AUTO: 28.1 PG (ref 26.1–32.9)
MCHC RBC AUTO-ENTMCNC: 33.5 G/DL (ref 31.4–35)
MCV RBC AUTO: 83.9 FL (ref 79.6–97.8)
MONOCYTES # BLD: 0.6 K/UL (ref 0.1–1.3)
MONOCYTES NFR BLD: 8 % (ref 4–12)
NEUTS SEG # BLD: 6.1 K/UL (ref 1.7–8.2)
NEUTS SEG NFR BLD: 78 % (ref 43–78)
NRBC # BLD: 0 K/UL (ref 0–0.2)
PLATELET # BLD AUTO: 564 K/UL (ref 150–450)
PMV BLD AUTO: 10.1 FL (ref 9.4–12.3)
POTASSIUM SERPL-SCNC: 4.1 MMOL/L (ref 3.5–5.1)
PROT SERPL-MCNC: 5.7 G/DL (ref 6.3–8.2)
RBC # BLD AUTO: 4.41 M/UL (ref 4.23–5.6)
SODIUM SERPL-SCNC: 148 MMOL/L (ref 136–145)
WBC # BLD AUTO: 7.9 K/UL (ref 4.3–11.1)

## 2020-11-20 PROCEDURE — 80053 COMPREHEN METABOLIC PANEL: CPT

## 2020-11-20 PROCEDURE — 74011000258 HC RX REV CODE- 258: Performed by: STUDENT IN AN ORGANIZED HEALTH CARE EDUCATION/TRAINING PROGRAM

## 2020-11-20 PROCEDURE — 74011250637 HC RX REV CODE- 250/637: Performed by: INTERNAL MEDICINE

## 2020-11-20 PROCEDURE — 74011000250 HC RX REV CODE- 250: Performed by: INTERNAL MEDICINE

## 2020-11-20 PROCEDURE — 65270000029 HC RM PRIVATE

## 2020-11-20 PROCEDURE — 74011250636 HC RX REV CODE- 250/636: Performed by: FAMILY MEDICINE

## 2020-11-20 PROCEDURE — 74011250636 HC RX REV CODE- 250/636: Performed by: INTERNAL MEDICINE

## 2020-11-20 PROCEDURE — 85025 COMPLETE CBC W/AUTO DIFF WBC: CPT

## 2020-11-20 PROCEDURE — 97530 THERAPEUTIC ACTIVITIES: CPT

## 2020-11-20 PROCEDURE — 74011000258 HC RX REV CODE- 258: Performed by: INTERNAL MEDICINE

## 2020-11-20 RX ORDER — HALOPERIDOL 5 MG/ML
2 INJECTION INTRAMUSCULAR ONCE
Status: COMPLETED | OUTPATIENT
Start: 2020-11-20 | End: 2020-11-20

## 2020-11-20 RX ORDER — PANTOPRAZOLE SODIUM 40 MG/1
40 TABLET, DELAYED RELEASE ORAL DAILY
Qty: 30 TAB | Refills: 0 | Status: SHIPPED | OUTPATIENT
Start: 2020-11-20 | End: 2020-12-20

## 2020-11-20 RX ORDER — DEXAMETHASONE 6 MG/1
6 TABLET ORAL
Qty: 5 TAB | Refills: 0 | Status: SHIPPED | OUTPATIENT
Start: 2020-11-20 | End: 2020-11-25

## 2020-11-20 RX ORDER — SODIUM CHLORIDE 450 MG/100ML
50 INJECTION, SOLUTION INTRAVENOUS CONTINUOUS
Status: DISCONTINUED | OUTPATIENT
Start: 2020-11-20 | End: 2020-11-21 | Stop reason: HOSPADM

## 2020-11-20 RX ADMIN — DEXAMETHASONE 6 MG: 4 TABLET ORAL at 09:21

## 2020-11-20 RX ADMIN — Medication 5 ML: at 21:26

## 2020-11-20 RX ADMIN — Medication 10 ML: at 14:02

## 2020-11-20 RX ADMIN — ENOXAPARIN SODIUM 30 MG: 40 INJECTION SUBCUTANEOUS at 21:26

## 2020-11-20 RX ADMIN — SODIUM CHLORIDE 50 ML/HR: 450 INJECTION, SOLUTION INTRAVENOUS at 11:50

## 2020-11-20 RX ADMIN — DONEPEZIL HYDROCHLORIDE 5 MG: 5 TABLET, FILM COATED ORAL at 21:25

## 2020-11-20 RX ADMIN — OXYCODONE HYDROCHLORIDE AND ACETAMINOPHEN 1000 MG: 500 TABLET ORAL at 09:25

## 2020-11-20 RX ADMIN — HALOPERIDOL LACTATE 2 MG: 5 INJECTION, SOLUTION INTRAMUSCULAR at 21:26

## 2020-11-20 RX ADMIN — Medication 220 MG: at 09:21

## 2020-11-20 RX ADMIN — OXYCODONE HYDROCHLORIDE AND ACETAMINOPHEN 1000 MG: 500 TABLET ORAL at 17:14

## 2020-11-20 RX ADMIN — MEMANTINE HYDROCHLORIDE 10 MG: 5 TABLET ORAL at 21:26

## 2020-11-20 RX ADMIN — REMDESIVIR 100 MG: 100 INJECTION, POWDER, LYOPHILIZED, FOR SOLUTION INTRAVENOUS at 11:50

## 2020-11-20 RX ADMIN — PANTOPRAZOLE SODIUM 40 MG: 40 TABLET, DELAYED RELEASE ORAL at 07:30

## 2020-11-20 RX ADMIN — ENOXAPARIN SODIUM 30 MG: 40 INJECTION SUBCUTANEOUS at 09:26

## 2020-11-20 NOTE — PROGRESS NOTES
Hospitalist Note     Admit Date:  2020  8:58 PM   Name:  Ziyad Wilcox   Age:  [de-identified] y.o.  :  1940   MRN:  [id number]   PCP:  Lavonne Bauer MD  Treatment Team: Attending Provider: Flaquita Garcia MD; Care Manager: Holly Hines, RN; Utilization Review: Halle Padilla RN; Utilization Review: Celine Gómez RN; Physical Therapy Assistant: Nigel Ponce PTA    HPI/Subjective:   Ziyad Wilcox is a [de-identified] y.o. male with medical history significant for Alzheimer's dementia who presented from home with fever since Saturday, decreased appetite, increased confusion and hypoxia. Pt seen and examined at bedside this morning. Denies any fevers, chills, SOB, JOHN, n/v/d, abd pain, calf pain or swelling. Pt up walking around the room upon my exam this morning, feeling good. Doing better from mental standpoint and agitation this morning. Labs showed hypernatremia this morning, and patient admits that he has not been drinking much water. Understands that he needs to drink more water and stay in hospital one more day for last remdesivir dose. No other complaints  Objective:     Patient Vitals for the past 24 hrs:   Temp Pulse Resp BP SpO2   20 0751 98.1 °F (36.7 °C) (!) 57 17 112/63 (!) 88 %   20 0346 97.9 °F (36.6 °C) 77 16 105/63 92 %   20 2352 98.3 °F (36.8 °C) 86 18 (!) 115/59 94 %   20 1946 97.6 °F (36.4 °C) 88 18 121/71 91 %   20 1522 97.4 °F (36.3 °C) (!) 58 17 119/62 96 %   20 1145 97.8 °F (36.6 °C) 76 17 126/73 93 %     Oxygen Therapy  O2 Sat (%): (!) 88 %(notified) (20 0751)  Pulse via Oximetry: 55 beats per minute (20 0201)  O2 Device: Room air (20 1039)  O2 Flow Rate (L/min): 2 l/min (20)  ETCO2 (mmHg): 94 mmHg (20)    Estimated body mass index is 19.58 kg/m² as calculated from the following:    Height as of this encounter: 5' 7\" (1.702 m). Weight as of this encounter: 56.7 kg (125 lb).     No intake or output data in the 24 hours ending 11/20/20 1130    *Note that automatically entered I/Os may not be accurate; dependent on patient compliance with collection and accurate  by techs. General:    Well nourished. Alert. CV:   RRR. No murmur, rub, or gallop. Lungs:   CTAB. No wheezing, rhonchi, or rales. Abdomen:   Soft, nontender, nondistended. Extremities: Warm and dry. No cyanosis or edema. Skin:     No rashes or jaundice. Neuro:  No gross focal deficits    Data Reviewed:  I have reviewed all labs, meds, and studies from the last 24 hours:  Recent Results (from the past 24 hour(s))   METABOLIC PANEL, COMPREHENSIVE    Collection Time: 11/20/20  6:10 AM   Result Value Ref Range    Sodium 148 (H) 136 - 145 mmol/L    Potassium 4.1 3.5 - 5.1 mmol/L    Chloride 117 (H) 98 - 107 mmol/L    CO2 26 21 - 32 mmol/L    Anion gap 5 (L) 7 - 16 mmol/L    Glucose 85 65 - 100 mg/dL    BUN 27 (H) 8 - 23 MG/DL    Creatinine 1.12 0.8 - 1.5 MG/DL    GFR est AA >60 >60 ml/min/1.73m2    GFR est non-AA >60 >60 ml/min/1.73m2    Calcium 7.9 (L) 8.3 - 10.4 MG/DL    Bilirubin, total 0.3 0.2 - 1.1 MG/DL    ALT (SGPT) 15 12 - 65 U/L    AST (SGOT) 33 15 - 37 U/L    Alk.  phosphatase 47 (L) 50 - 136 U/L    Protein, total 5.7 (L) 6.3 - 8.2 g/dL    Albumin 2.7 (L) 3.2 - 4.6 g/dL    Globulin 3.0 2.3 - 3.5 g/dL    A-G Ratio 0.9 (L) 1.2 - 3.5     CBC WITH AUTOMATED DIFF    Collection Time: 11/20/20  6:10 AM   Result Value Ref Range    WBC 7.9 4.3 - 11.1 K/uL    RBC 4.41 4.23 - 5.6 M/uL    HGB 12.4 (L) 13.6 - 17.2 g/dL    HCT 37.0 (L) 41.1 - 50.3 %    MCV 83.9 79.6 - 97.8 FL    MCH 28.1 26.1 - 32.9 PG    MCHC 33.5 31.4 - 35.0 g/dL    RDW 14.4 11.9 - 14.6 %    PLATELET 791 (H) 232 - 450 K/uL    MPV 10.1 9.4 - 12.3 FL    ABSOLUTE NRBC 0.00 0.0 - 0.2 K/uL    DF AUTOMATED      NEUTROPHILS 78 43 - 78 %    LYMPHOCYTES 13 13 - 44 %    MONOCYTES 8 4.0 - 12.0 %    EOSINOPHILS 0 (L) 0.5 - 7.8 %    BASOPHILS 0 0.0 - 2.0 % IMMATURE GRANULOCYTES 1 0.0 - 5.0 %    ABS. NEUTROPHILS 6.1 1.7 - 8.2 K/UL    ABS. LYMPHOCYTES 1.0 0.5 - 4.6 K/UL    ABS. MONOCYTES 0.6 0.1 - 1.3 K/UL    ABS. EOSINOPHILS 0.0 0.0 - 0.8 K/UL    ABS. BASOPHILS 0.0 0.0 - 0.2 K/UL    ABS. IMM. GRANS. 0.1 0.0 - 0.5 K/UL        Current Meds:  Current Facility-Administered Medications   Medication Dose Route Frequency    0.45% sodium chloride infusion  50 mL/hr IntraVENous CONTINUOUS    0.9% sodium chloride infusion 250 mL  250 mL IntraVENous PRN    sodium chloride (NS) flush 5-40 mL  5-40 mL IntraVENous Q8H    sodium chloride (NS) flush 5-40 mL  5-40 mL IntraVENous PRN    acetaminophen (TYLENOL) tablet 650 mg  650 mg Oral Q6H PRN    Or    acetaminophen (TYLENOL) suppository 650 mg  650 mg Rectal Q6H PRN    polyethylene glycol (MIRALAX) packet 17 g  17 g Oral DAILY PRN    promethazine (PHENERGAN) tablet 12.5 mg  12.5 mg Oral Q6H PRN    Or    ondansetron (ZOFRAN) injection 4 mg  4 mg IntraVENous Q6H PRN    enoxaparin (LOVENOX) injection 30 mg  30 mg SubCUTAneous Q12H    memantine (NAMENDA) tablet 10 mg  10 mg Oral QHS    donepeziL (ARICEPT) tablet 5 mg  5 mg Oral QHS    zinc sulfate tablet 220 mg  220 mg Oral DAILY    ascorbic acid (vitamin C) (VITAMIN C) tablet 1,000 mg  1,000 mg Oral BID    dexAMETHasone (DECADRON) tablet 6 mg  6 mg Oral DAILY    pantoprazole (PROTONIX) tablet 40 mg  40 mg Oral ACB    remdesivir 100 mg in 0.9% sodium chloride 250 mL IVPB  100 mg IntraVENous Q24H       Other Studies:  No results found for this visit on 11/16/20. No results found.     All Micro Results     Procedure Component Value Units Date/Time    CULTURE, BLOOD [[id number]] Collected:  11/16/20 2232    Order Status:  Completed Specimen:  Blood Updated:  11/20/20 0829     Special Requests: --        NO SPECIAL REQUESTS  LEFT  FOREARM       Culture result: NO GROWTH 4 DAYS       CULTURE, BLOOD [[id number]] Collected:  11/16/20 2117    Order Status:  Completed Specimen: Blood Updated:  11/20/20 0829     Special Requests: --        NO SPECIAL REQUESTS  LEFT  FOREARM       Culture result: NO GROWTH 4 DAYS             SARS-CoV-2 Lab Results  \"Novel Coronavirus\" Test: No results found for: COV2NT   \"Emergent Disease\" Test: No results found for: EDPR  \"SARS-COV-2\" Test: No results found for: XGCOVT  Rapid Test:   Lab Results   Component Value Date/Time    COVR Detected (AA) 11/16/2020 10:41 PM            Assessment and Plan:     Hospital Problems as of 11/20/2020 Date Reviewed: 2/28/2017          Codes Class Noted - Resolved POA    * (Principal) Acute respiratory failure with hypoxia ([facility] ICD-10-CM: J96.01  ICD-9-CM: 518.81  11/17/2020 - Present Unknown        Pneumonia due to COVID-19 virus ICD-10-CM: U07.1, J12.89  ICD-9-CM: 480.8  11/17/2020 - Present Unknown        Alzheimer disease ([facility] ICD-10-CM: G30.9, F02.80  ICD-9-CM: 331.0  11/17/2020 - Present Unknown              Plan:  Acute Respiratory Failure with Hypoxia  COVID 19 Pneumonia  -O2 supplement as needed and wean as tolerated, now off oxygen today   -convalescent plasma given 11/17  -decadron (day 4/10) + zinc/Vitamin C  -ID for remdesivir (day 4/5)  -lovenox for DVT PPx , D-Dimer elevated to 0.55, cont to monitor  -procal 0.11, will hold Abx for now with patient improving on current regimen    #Hypernatremia  - likely from dehydration  - starting on 1/2 NS at 50cc/hr   - recheck tomorrow     Alzheimer Disease: continue with home medication. DC planning/Dispo:  Pending further clinical course and finished remdesivir treament, can likely dc back home with home health tomorrow.      Diet:  DIET REGULAR  DVT ppx:  Lovenox    Signed:  Livingston Phoenix, MD

## 2020-11-20 NOTE — PROGRESS NOTES
Call returned to give update to patient's daughter, Bean Bagley. There was no answer. Message left to call nurse back for update.

## 2020-11-20 NOTE — PROGRESS NOTES
CERTIFICATE OF RETURN TO WORK    January 21, 2019      Shaan Farley  [address]  [facility] [address]                      This is to certify that Shaan Farley has been under my care from 1/21/2019 and can return to regular work on 1/21/2019.     RESTRICTIONS: none      REMARKS: none          Sincerely,          Miki Warren MD  [address] [facility] [address] [phone]     Problem: Mobility Impaired (Adult and Pediatric)  Goal: *Acute Goals and Plan of Care (Insert Text)  Description: LTG:  (1.)Mr. Segovia will move from supine to sit and sit to supine, scoot up and down, and roll side to side INDEPENDENT within 7 treatment day(s). (2.)Mr. Segovia will transfer from bed to chair and chair to bed INDEPENDENTLY within 7 treatment day(s). (3.)Mr. Segovia will ambulate INDEPENDENTLY for 200+ feet within 7 treatment day(s). (4.)Mr. Segovia will perform seated and standing exercises for 15+ minutes to improve strength and mobility within 7 days. ________________________________________________________________________________________________     Outcome: Progressing Towards Goal     PHYSICAL THERAPY: Daily Note and PM 11/20/2020  INPATIENT: PT Visit Days : 3  Payor: [employer] / Plan: [address] / Product Type: [employer] /       NAME/AGE/GENDER: Bashir Montemayor is a [de-identified] y.o. male   PRIMARY DIAGNOSIS: Acute respiratory failure with hypoxia ([facility] [address] [J96.01]  Pneumonia due to COVID-19 virus [U07.1, J12.89] Acute respiratory failure with hypoxia ([address] Acute respiratory failure with hypoxia ([address]       ICD-10: Treatment Diagnosis:    · Generalized Muscle Weakness (M62.81)  · Other abnormalities of gait and mobility (R26.89)   Precaution/Allergies:  Patient has no known allergies. ASSESSMENT:     Mr. Cesar Em is a very pleasant [de-identified]year old male with history of Alzheimer's disease who is admitted from his senior care community with respiratory failure and hypoxia due to COVID 19 virus infection. He is typically independent and active per chart review, walks for exercises and denies DME use. Patient was sitting up in recliner chair upon contact and agreeable to PT. Pleasantly confused; repeats himself often. Patient continues to make great progress towards PT goals as noted by increased gait distance and activity tolerance.  O2 ranged from 86-92% during ambulation on room air. Will continue PT efforts. Dc needs TBD- hopefully can return to his apartment as long as this is safe from cognitive standpoint. This section established at most recent assessment   PROBLEM LIST (Impairments causing functional limitations):  1. Decreased Strength  2. Decreased Balance  3. Decreased Activity Tolerance  4. Decreased Pacing Skills   INTERVENTIONS PLANNED: (Benefits and precautions of physical therapy have been discussed with the patient.)  1. Balance Exercise  2. Bed Mobility  3. Gait Training  4. Home Exercise Program (HEP)  5. Therapeutic Activites  6. Therapeutic Exercise/Strengthening  7. Transfer Training     TREATMENT PLAN: Frequency/Duration: 3 times a week for duration of hospital stay  Rehabilitation Potential For Stated Goals: Excellent     REHAB RECOMMENDATIONS (at time of discharge pending progress):    Placement: It is my opinion, based on this patient's performance to date, that Mr. Comfort Manjarrez may benefit from participating in 1-2 additional therapy sessions in order to continue to assess for rehab potential and then make recommendation for disposition at discharge. HH PT vs no needs? Equipment:    None at this time              HISTORY:   History of Present Injury/Illness (Reason for Referral):  Per H&P, \"William Bautista is a [de-identified] y.o. male with medical history significant for Alzheimer's dementia who presented from home with fever since Saturday, decreased appetite, increased confusion and hypoxia today. And is alert and oriented x3 but very hard of hearing. Provided by his daughter who was at bedside. Patient lives independently in his own apt and walks daily without any issues. He was found to have some confusions over the weekend and noted to have febrile episodes with Tmax of 102.7F. He was also noted to have decreased PO intake.  Patient was taken to the Emergency MD today and to be hypotensive with blood pressure 92/50 and saturation low 90s. ED for further work-up and management. CXR at Emergency MD showed bilateral infiltrate suspicious for covid19 infection. In the ED, patient was noted to be 88% on room air with saturation improving to 97% on 2 L nasal cannula. He is afebrile with BP of 101/54. CXR shows new hazy bilateral lung infiltrates. Labs are unremarkable. \"    Past Medical History/Comorbidities:   Mr. Sushma Nieves  has a past medical history of Hematuria, microscopic, Memory loss, and PUD (peptic ulcer disease). He also has no past medical history of Adverse effect of anesthesia, Diabetes ([address] Difficult intubation, Malignant hyperthermia due to anesthesia, Nausea & vomiting, or Pseudocholinesterase deficiency. Mr. Sushma Nieves  has a past surgical history that includes pr appendectomy and hx hernia repair. Social History/Living Environment:   Home Environment: Apartment  Living Alone: Yes  Support Systems: Child(joshua), Family member(s)  Patient Expects to be Discharged to[de-identified] Private residence  Current DME Used/Available at Home: None  Prior Level of Function/Work/Activity:  Lives alone. Ambulatory without DME. Number of Personal Factors/Comorbidities that affect the Plan of Care: 1-2: MODERATE COMPLEXITY   EXAMINATION:   Most Recent Physical Functioning:   Gross Assessment:                  Posture:     Balance:  Sitting: Intact  Standing: Intact Bed Mobility:     Wheelchair Mobility:     Transfers:  Sit to Stand: Independent  Stand to Sit: Independent  Gait:     Base of Support: Narrowed  Speed/Rosa: Slow  Gait Abnormalities: Decreased step clearance;Trunk sway increased  Distance (ft): 500 Feet (ft)  Ambulation - Level of Assistance: Supervision; Independent  Interventions: Verbal cues; Safety awareness training      Body Structures Involved:  1. Lungs  2. Muscles Body Functions Affected:  1. Mental  2. Respiratory  3. Movement Related Activities and Participation Affected:  1. General Tasks and Demands  2. Mobility  3.  Domestic Life  4. Community, Social and [place]   Number of elements that affect the Plan of Care: 4+: HIGH COMPLEXITY   CLINICAL PRESENTATION:   Presentation: Stable and uncomplicated: LOW COMPLEXITY   CLINICAL DECISION MAKIN [facility] Inpatient Short Form  How much difficulty does the patient currently have. .. Unable A Lot A Little None   1. Turning over in bed (including adjusting bedclothes, sheets and blankets)? [] 1   [] 2   [] 3   [x] 4   2. Sitting down on and standing up from a chair with arms ( e.g., wheelchair, bedside commode, etc.)   [] 1   [] 2   [] 3   [x] 4   3. Moving from lying on back to sitting on the side of the bed? [] 1   [] 2   [] 3   [x] 4   How much help from another person does the patient currently need. .. Total A Lot A Little None   4. Moving to and from a bed to a chair (including a wheelchair)? [] 1   [] 2   [x] 3   [] 4   5. Need to walk in hospital room? [] 1   [] 2   [x] 3   [] 4   6. Climbing 3-5 steps with a railing? [] 1   [] 2   [x] 3   [] 4   © , Trustees of [address] [place] [address] [address] [id number], under license to [employer]. All rights reserved      Score:  Initial: 21 Most Recent: X (Date: -- )    Interpretation of Tool:  Represents activities that are increasingly more difficult (i.e. Bed mobility, Transfers, Gait). Medical Necessity:     · Patient demonstrates   · good  ·  rehab potential due to higher previous functional level. Reason for Services/Other Comments:  · Patient   · continues to demonstrate capacity to improve strength, balance, and activity tolerance which will   · increase independence and increase safety  · .    Use of outcome tool(s) and clinical judgement create a POC that gives a: Clear prediction of patient's progress: LOW COMPLEXITY            TREATMENT:   (In addition to Assessment/Re-Assessment sessions the following treatments were rendered)   Pre-treatment Symptoms/Complaints:  talkative, pleasantly confused  Pain: Initial:   Pain Intensity 1: 0  Post Session:  0/10     Therapeutic Activity: (    23 Minutes): Therapeutic activities including transfer training, static/dynamic standing balance, ambulation on level ground, and patient education to improve mobility, strength and balance. Required moderate Verbal cues; Safety awareness training to promote static and dynamic balance in standing and promote coordination of bilateral, lower extremity(s). Braces/Orthotics/Lines/Etc:   · IV  · O2 Device: Room air  Treatment/Session Assessment:    · Response to Treatment:  see above  · Interdisciplinary Collaboration:   o Physical Therapy Assistant  o Registered Nurse  · After treatment position/precautions:   o Up in chair  o Bed/Chair-wheels locked  o Bed in low position  o Call light within reach  o RN notified   · Compliance with Program/Exercises: Will assess as treatment progresses  · Recommendations/Intent for next treatment session: \"Next visit will focus on advancements to more challenging activities and reduction in assistance provided\".   Total Treatment Duration:  PT Patient Time In/Time Out  Time In: 1440  Time Out: [address], PTA

## 2020-11-20 NOTE — PROGRESS NOTES
Oxygen Qualifier       Room air: SpO2 with O2 and liter flow   Resting SpO2  94%  NA   Ambulating SpO2  90%  NA       Completed by:    Noemi Hernandez RT

## 2020-11-21 VITALS
HEART RATE: 90 BPM | WEIGHT: 125 LBS | OXYGEN SATURATION: 91 % | HEIGHT: 67 IN | SYSTOLIC BLOOD PRESSURE: 109 MMHG | RESPIRATION RATE: 18 BRPM | BODY MASS INDEX: 19.62 KG/M2 | DIASTOLIC BLOOD PRESSURE: 62 MMHG | TEMPERATURE: 98.3 F

## 2020-11-21 LAB
ALBUMIN SERPL-MCNC: 2.4 G/DL (ref 3.2–4.6)
ALBUMIN/GLOB SERPL: 0.9 {RATIO} (ref 1.2–3.5)
ALP SERPL-CCNC: 57 U/L (ref 50–136)
ALT SERPL-CCNC: 18 U/L (ref 12–65)
ANION GAP SERPL CALC-SCNC: 6 MMOL/L (ref 7–16)
AST SERPL-CCNC: 33 U/L (ref 15–37)
BACTERIA SPEC CULT: NORMAL
BACTERIA SPEC CULT: NORMAL
BASOPHILS # BLD: 0 K/UL (ref 0–0.2)
BASOPHILS NFR BLD: 0 % (ref 0–2)
BILIRUB SERPL-MCNC: 0.5 MG/DL (ref 0.2–1.1)
BUN SERPL-MCNC: 23 MG/DL (ref 8–23)
CALCIUM SERPL-MCNC: 7.7 MG/DL (ref 8.3–10.4)
CHLORIDE SERPL-SCNC: 117 MMOL/L (ref 98–107)
CO2 SERPL-SCNC: 23 MMOL/L (ref 21–32)
CREAT SERPL-MCNC: 1.05 MG/DL (ref 0.8–1.5)
DIFFERENTIAL METHOD BLD: ABNORMAL
EOSINOPHIL # BLD: 0 K/UL (ref 0–0.8)
EOSINOPHIL NFR BLD: 0 % (ref 0.5–7.8)
ERYTHROCYTE [DISTWIDTH] IN BLOOD BY AUTOMATED COUNT: 14.4 % (ref 11.9–14.6)
GLOBULIN SER CALC-MCNC: 2.8 G/DL (ref 2.3–3.5)
GLUCOSE SERPL-MCNC: 82 MG/DL (ref 65–100)
HCT VFR BLD AUTO: 35.3 % (ref 41.1–50.3)
HGB BLD-MCNC: 11.7 G/DL (ref 13.6–17.2)
IMM GRANULOCYTES # BLD AUTO: 0.1 K/UL (ref 0–0.5)
IMM GRANULOCYTES NFR BLD AUTO: 1 % (ref 0–5)
LYMPHOCYTES # BLD: 1.1 K/UL (ref 0.5–4.6)
LYMPHOCYTES NFR BLD: 16 % (ref 13–44)
MCH RBC QN AUTO: 28.1 PG (ref 26.1–32.9)
MCHC RBC AUTO-ENTMCNC: 33.1 G/DL (ref 31.4–35)
MCV RBC AUTO: 84.9 FL (ref 79.6–97.8)
MONOCYTES # BLD: 0.5 K/UL (ref 0.1–1.3)
MONOCYTES NFR BLD: 7 % (ref 4–12)
NEUTS SEG # BLD: 5.1 K/UL (ref 1.7–8.2)
NEUTS SEG NFR BLD: 76 % (ref 43–78)
NRBC # BLD: 0 K/UL (ref 0–0.2)
PLATELET # BLD AUTO: 503 K/UL (ref 150–450)
PMV BLD AUTO: 9.9 FL (ref 9.4–12.3)
POTASSIUM SERPL-SCNC: 3.6 MMOL/L (ref 3.5–5.1)
PROT SERPL-MCNC: 5.2 G/DL (ref 6.3–8.2)
RBC # BLD AUTO: 4.16 M/UL (ref 4.23–5.6)
SERVICE CMNT-IMP: NORMAL
SERVICE CMNT-IMP: NORMAL
SODIUM SERPL-SCNC: 146 MMOL/L (ref 136–145)
WBC # BLD AUTO: 6.8 K/UL (ref 4.3–11.1)

## 2020-11-21 PROCEDURE — 74011250636 HC RX REV CODE- 250/636: Performed by: INTERNAL MEDICINE

## 2020-11-21 PROCEDURE — 74011000250 HC RX REV CODE- 250: Performed by: INTERNAL MEDICINE

## 2020-11-21 PROCEDURE — 36415 COLL VENOUS BLD VENIPUNCTURE: CPT

## 2020-11-21 PROCEDURE — 74011000258 HC RX REV CODE- 258: Performed by: INTERNAL MEDICINE

## 2020-11-21 PROCEDURE — 80053 COMPREHEN METABOLIC PANEL: CPT

## 2020-11-21 PROCEDURE — 85025 COMPLETE CBC W/AUTO DIFF WBC: CPT

## 2020-11-21 PROCEDURE — 74011250637 HC RX REV CODE- 250/637: Performed by: INTERNAL MEDICINE

## 2020-11-21 RX ADMIN — PANTOPRAZOLE SODIUM 40 MG: 40 TABLET, DELAYED RELEASE ORAL at 09:14

## 2020-11-21 RX ADMIN — Medication 220 MG: at 09:15

## 2020-11-21 RX ADMIN — REMDESIVIR 100 MG: 100 INJECTION, POWDER, LYOPHILIZED, FOR SOLUTION INTRAVENOUS at 10:25

## 2020-11-21 RX ADMIN — DEXAMETHASONE 6 MG: 4 TABLET ORAL at 09:13

## 2020-11-21 RX ADMIN — Medication 10 ML: at 06:14

## 2020-11-21 RX ADMIN — OXYCODONE HYDROCHLORIDE AND ACETAMINOPHEN 1000 MG: 500 TABLET ORAL at 09:14

## 2020-11-21 RX ADMIN — ENOXAPARIN SODIUM 30 MG: 40 INJECTION SUBCUTANEOUS at 09:15

## 2020-11-21 NOTE — PROGRESS NOTES
Patient d/c home today. Patient will need home 02. Patient provider for home 02 will be [facility].  Patient has received a home 02 tank for travel home. CM made contact with Vikram Wheeler from [facility] and provided him daughter Yessenia Kirk) address where patient is being d/c too. Daughter Yessenia Kirk) address: [address] [place] [address].  Daughter will transport patient home. Information has been faxed. CM will continue to monitor and remain available for any needs that may occur. Care Management Interventions  PCP Verified by CM: Yes(Dr. Jamsin Blanco)  Mode of Transport at Discharge:  Other (see comment)(family to transport)  Transition of Care Consult (CM Consult): Discharge Planning  Physical Therapy Consult: Yes  Occupational Therapy Consult: Yes  Current Support Network: Lives Alone, Other(1st floor apartment)  Confirm Follow Up Transport: Family  Freedom of Choice List was Provided with Basic Dialogue that Supports the Patient's Individualized Plan of Care/Goals, Treatment Preferences and Shares the Quality Data Associated with the Providers?: Yes  Discharge Location  Discharge Placement: Home

## 2020-11-21 NOTE — DISCHARGE INSTRUCTIONS
Please continue take Decadron 6 mg once a day for the next 5 days starting tomorrow. All other home medications are the same. Follow-up with your primary care physician in the next week. DISCHARGE SUMMARY from Nurse    PATIENT INSTRUCTIONS:    After general anesthesia or intravenous sedation, for 24 hours or while taking prescription Narcotics:  · Limit your activities  · Do not drive and operate hazardous machinery  · Do not make important personal or business decisions  · Do  not drink alcoholic beverages  · If you have not urinated within 8 hours after discharge, please contact your surgeon on call. Report the following to your surgeon:  · Excessive pain, swelling, redness or odor of or around the surgical area  · Temperature over 100.5  · Nausea and vomiting lasting longer than 4 hours or if unable to take medications  · Any signs of decreased circulation or nerve impairment to extremity: change in color, persistent  numbness, tingling, coldness or increase pain  · Any questions    What to do at Home:  Recommended activity: Activity as tolerated. If you experience any of the following symptoms listed above, please follow up with your primary care doctor. *  Please give a list of your current medications to your Primary Care Provider. *  Please update this list whenever your medications are discontinued, doses are      changed, or new medications (including over-the-counter products) are added. *  Please carry medication information at all times in case of emergency situations. These are general instructions for a healthy lifestyle:    No smoking/ No tobacco products/ Avoid exposure to second hand smoke  Surgeon General's Warning:  Quitting smoking now greatly reduces serious risk to your health.     Obesity, smoking, and sedentary lifestyle greatly increases your risk for illness    A healthy diet, regular physical exercise & weight monitoring are important for maintaining a healthy lifestyle    You may be retaining fluid if you have a history of heart failure or if you experience any of the following symptoms:  Weight gain of 3 pounds or more overnight or 5 pounds in a week, increased swelling in our hands or feet or shortness of breath while lying flat in bed. Please call your doctor as soon as you notice any of these symptoms; do not wait until your next office visit. The discharge information has been reviewed with the caregiver. The caregiver verbalized understanding. Discharge medications reviewed with the caregiver and appropriate educational materials and side effects teaching were provided.   ___________________________________________________________________________________________________________________________________

## 2020-11-21 NOTE — PROGRESS NOTES
Oxygen Qualifier       Room air: SpO2 with O2 and liter flow   Resting SpO2  90%    Ambulating SpO2  87% 90% on 1L     Patient required 1L of O2 while ambulating. No distress noted at this time. Completed by:     Gertrude Diggs, RT

## 2020-11-21 NOTE — PROGRESS NOTES
Discharge instructions and Rx's reviewed with daughter. Daughter verbalized understanding. Will take pt out via w/c when family has arrived for pickup.

## 2020-11-21 NOTE — DISCHARGE SUMMARY
Hospitalist Discharge Summary     Admit Date:  2020  8:58 PM   DC note date: 2020  Name:  Driss Shaw   Age:  [de-identified] y.o.  :  1940   MRN:  [id number]   PCP:  Eugenio Dubois MD  Treatment Team: Attending Provider: Dolores Lazaro MD; Care Manager: David Lawton, RN; Utilization Review: Andrea Alejandro RN    Problem List for this Hospitalization:  Hospital Problems as of 2020 Date Reviewed: 2017          Codes Class Noted - Resolved POA    * (Principal) Acute respiratory failure with hypoxia [facility]) ICD-10-CM: J96.01  ICD-9-CM: 518.81  2020 - Present Unknown        Pneumonia due to COVID-19 virus ICD-10-CM: U07.1, J12.89  ICD-9-CM: 480.8  2020 - Present Unknown        [facility] ICD-10-CM: G30.9, F02.80  ICD-9-CM: 331.0  2020 - Present Unknown                Admission HPI from 2020:    \"William Segovia is a [de-identified] y.o. male with medical history significant for Alzheimer's dementia who presented from home with fever since Saturday, decreased appetite, increased confusion and hypoxia today. And is alert and oriented x3 but very hard of hearing. Provided by his daughter who was at bedside. Patient lives independently in his own apt and walks daily without any issues. He was found to have some confusions over the weekend and noted to have febrile episodes with Tmax of 102.7F. He was also noted to have decreased PO intake. Patient was taken to the Emergency MD today and to be hypotensive with blood pressure 92/50 and saturation low 90s. ED for further work-up and management. CXR at Emergency MD showed bilateral infiltrate suspicious for covid19 infection.      In the ED, patient was noted to be 88% on room air with saturation improving to 97% on 2 L nasal cannula. He is afebrile with BP of 101/54. CXR shows new hazy bilateral lung infiltrates. Labs are unremarkable. [facility] Hospital Course:  She had rapid Covid test which was positive. Added on dexamethasone 6 mg daily, zinc and vitamin C, convalescent plasma and remdesivir due to hypoxia requiring oxygen supplementation. He was given Lovenox for DVT prophylaxis and was continued on his home medications for Alzheimer's disease. Did have episodes of worsening mental status in the setting of dementia. He did require haldol for 1 night due to agitation and trying to leave the hospital.  He was reoriented and continued to improve. He did require 1 day of IV half-normal saline due to mild hyponatremia. He was encouraged to increase his p.o. water intake. Eventually was able to be weaned off oxygen with walk test showing 1L O2 requirements with ambulation. He is being discharged with home O2 and course of steroids to finish off treatment for COVID-19. Disposition: Home with home health  Activity: Activity as tolerated  Diet: DIET REGULAR  Code Status: Full Code    Follow Up Orders:  No orders of the defined types were placed in this encounter. Follow-up Information     Follow up With Specialties Details Why Contact Info    Davis Warren MD Family Medicine In 1 week  [address]  [phone]            Discharge meds at bottom of this note. Plan was discussed with patient and daughters. All questions answered. Patient was stable at time of discharge. Given instructions to call a physician or return if any concerns. Discharge summary and encounter summary was sent to PCP electronically via \"Comm Mgt\" link in [facility], if possible. Diagnostic Imaging/Tests:   Xr Chest Port    Result Date: 11/16/2020  EXAM: Chest x-ray. INDICATION: Dyspnea. COMPARISON: Prior chest x-ray on December 19, 2019. TECHNIQUE: Frontal view chest x-ray. FINDINGS: There are new hazy infiltrates in the mid and lower lung fields bilaterally. The cardiac size and mediastinal contour are within normal limits. No pneumothorax or pleural effusion is seen.  Old rib fractures are present on the right. IMPRESSION: New hazy bilateral lung infiltrates could relate to aspiration, or atypical pneumonia such as Covid 19. Echocardiogram results:  No results found for this visit on 11/16/20.     Procedures done this admission:  * No surgery found *    All Micro Results     Procedure Component Value Units Date/Time    CULTURE, BLOOD [[id number]] Collected:  11/16/20 2232    Order Status:  Completed Specimen:  Blood Updated:  11/21/20 0617     Special Requests: --        NO SPECIAL REQUESTS  LEFT  FOREARM       Culture result: NO GROWTH 5 DAYS       CULTURE, BLOOD [[id number]] Collected:  11/16/20 2117    Order Status:  Completed Specimen:  Blood Updated:  11/21/20 0617     Special Requests: --        NO SPECIAL REQUESTS  LEFT  FOREARM       Culture result: NO GROWTH 5 DAYS             SARS-CoV-2 Lab Results  \"Novel Coronavirus\" Test: No results found for: COV2NT   \"Emergent Disease\" Test: No results found for: EDPR  \"SARS-COV-2\" Test: No results found for: XGCOVT  Rapid Test:   Lab Results   Component Value Date/Time    COVR Detected (AA) 11/16/2020 10:41 PM            Labs: Results:       BMP, Mg, Phos Recent Labs     11/21/20  0629 11/20/20  0610 11/19/20  0323   * 148* 145   K 3.6 4.1 4.2   * 117* 116*   CO2 23 26 16*   AGAP 6* 5* 13   BUN 23 27* 25*   CREA 1.05 1.12 1.03   CA 7.7* 7.9* 7.9*   GLU 82 85 117*      CBC Recent Labs     11/21/20  0629 11/20/20  0610 11/19/20  0323   WBC 6.8 7.9 9.3   RBC 4.16* 4.41 4.54   HGB 11.7* 12.4* 12.8*   HCT 35.3* 37.0* 40.4*   * 564* 369   GRANS 76 78 85*   LYMPH 16 13 9*   EOS 0* 0* 0*   MONOS 7 8 5   BASOS 0 0 0   IG 1 1 1   ANEU 5.1 6.1 7.9   ABL 1.1 1.0 0.8   JENNY 0.0 0.0 0.0   ABM 0.5 0.6 0.5   ABB 0.0 0.0 0.0   AIG 0.1 0.1 0.1      LFT Recent Labs     11/21/20  0629 11/20/20  0610 11/19/20  0323   ALT 18 15 13   AP 57 47* 52   TP 5.2* 5.7* 6.0*   ALB 2.4* 2.7* 2.5*   GLOB 2.8 3.0 3.5   AGRAT 0.9* 0.9* 0.7*      Cardiac Testing Lab Results   Component Value Date/Time    Troponin-I, Qt. <0.02 (L) 12/19/2019 10:12 PM    Troponin-I, Qt. <0.02 (L) 12/19/2019 05:18 PM      Coagulation Tests No results found for: PTP, INR, APTT, INREXT   A1c No results found for: HBA1C, HGBE8, AMR2NLGR   Lipid Panel No results found for: CHOL, CHOLPOCT, CHOLX, CHLST, CHOLV, 036633, HDL, HDLP, LDL, LDLC, DLDLP, 112444, VLDLC, VLDL, TGLX, TRIGL, TRIGP, TGLPOCT, CHHD, CHHDX   Thyroid Panel No results found for: TSH, T4, FT4, TT3, T3U, TSHEXT     Most Recent UA No results found for: COLOR, APPRN, REFSG, AMIRAH, PROTU, GLUCU, KETU, BILU, BLDU, UROU, RENÉ, LEUKU, WBCU, RBCU, UEPI, BACTU, CASTS, UCRY, MUCUS, UCOM     No Known Allergies  Immunization History   Administered Date(s) Administered    Influenza Vaccine [employer]) PF (>6 Mo Flulaval, Fluarix, and >3 Yrs Afluria, Fluzone 08873) 12/20/2019       All Labs from Last 24 Hrs:  Recent Results (from the past 24 hour(s))   CBC WITH AUTOMATED DIFF    Collection Time: 11/21/20  6:29 AM   Result Value Ref Range    WBC 6.8 4.3 - 11.1 K/uL    RBC 4.16 (L) 4.23 - 5.6 M/uL    HGB 11.7 (L) 13.6 - 17.2 g/dL    HCT 35.3 (L) 41.1 - 50.3 %    MCV 84.9 79.6 - 97.8 FL    MCH 28.1 26.1 - 32.9 PG    MCHC 33.1 31.4 - 35.0 g/dL    RDW 14.4 11.9 - 14.6 %    PLATELET 542 (H) 876 - 450 K/uL    MPV 9.9 9.4 - 12.3 FL    ABSOLUTE NRBC 0.00 0.0 - 0.2 K/uL    DF AUTOMATED      NEUTROPHILS 76 43 - 78 %    LYMPHOCYTES 16 13 - 44 %    MONOCYTES 7 4.0 - 12.0 %    EOSINOPHILS 0 (L) 0.5 - 7.8 %    BASOPHILS 0 0.0 - 2.0 %    IMMATURE GRANULOCYTES 1 0.0 - 5.0 %    ABS. NEUTROPHILS 5.1 1.7 - 8.2 K/UL    ABS. LYMPHOCYTES 1.1 0.5 - 4.6 K/UL    ABS. MONOCYTES 0.5 0.1 - 1.3 K/UL    ABS. EOSINOPHILS 0.0 0.0 - 0.8 K/UL    ABS. BASOPHILS 0.0 0.0 - 0.2 K/UL    ABS. IMM.  GRANS. 0.1 0.0 - 0.5 K/UL   METABOLIC PANEL, COMPREHENSIVE    Collection Time: 11/21/20  6:29 AM   Result Value Ref Range    Sodium 146 (H) 136 - 145 mmol/L    Potassium 3.6 3.5 - 5.1 mmol/L Chloride 117 (H) 98 - 107 mmol/L    CO2 23 21 - 32 mmol/L    Anion gap 6 (L) 7 - 16 mmol/L    Glucose 82 65 - 100 mg/dL    BUN 23 8 - 23 MG/DL    Creatinine 1.05 0.8 - 1.5 MG/DL    GFR est AA >60 >60 ml/min/1.73m2    GFR est non-AA >60 >60 ml/min/1.73m2    Calcium 7.7 (L) 8.3 - 10.4 MG/DL    Bilirubin, total 0.5 0.2 - 1.1 MG/DL    ALT (SGPT) 18 12 - 65 U/L    AST (SGOT) 33 15 - 37 U/L    Alk. phosphatase 57 50 - 136 U/L    Protein, total 5.2 (L) 6.3 - 8.2 g/dL    Albumin 2.4 (L) 3.2 - 4.6 g/dL    Globulin 2.8 2.3 - 3.5 g/dL    A-G Ratio 0.9 (L) 1.2 - 3.5         Discharge Exam:  Patient Vitals for the past 24 hrs:   Temp Pulse Resp BP SpO2   11/21/20 0801 98.2 °F (36.8 °C) 80 18 111/63 91 %   11/21/20 0413 97.6 °F (36.4 °C) 70 18 126/68 91 %   11/21/20 0137 98.1 °F (36.7 °C) 76 18 112/71 91 %   11/20/20 2038 97.8 °F (36.6 °C) 75 17 (!) 116/57 92 %   11/20/20 1537 97.9 °F (36.6 °C) 75 18 (!) 145/89 95 %   11/20/20 1137 97.4 °F (36.3 °C) 63 17 125/63 91 %     Oxygen Therapy  O2 Sat (%): 91 % (11/21/20 0801)  Pulse via Oximetry: 55 beats per minute (11/17/20 0201)  O2 Device: Room air (11/19/20 1039)  O2 Flow Rate (L/min): 2 l/min (11/17/20 1915)  ETCO2 (mmHg): 94 mmHg (11/18/20 2024)    Estimated body mass index is 19.58 kg/m² as calculated from the following:    Height as of this encounter: 5' 7\" (1.702 m). Weight as of this encounter: 56.7 kg (125 lb). No intake or output data in the 24 hours ending 11/21/20 1010    *Note that automatically entered I/Os may not be accurate; dependent on patient compliance with collection and accurate  by assistants. General:    Well nourished. Alert. Eyes:   Normal sclerae. Extraocular movements intact. ENT:  Normocephalic, atraumatic. Moist mucous membranes  CV:   Regular rate and rhythm. No murmur, rub, or gallop. Lungs:  Clear to auscultation bilaterally. No wheezing, rhonchi, or rales. Abdomen: Soft, nontender, nondistended.    Extremities: Warm and dry. No cyanosis or edema. Neurologic: CN II-XII grossly intact. No gross focal deficits   Skin:     No rashes or jaundice. Psych:  Normal mood and affect. Current Med List in Hospital:   Current Facility-Administered Medications   Medication Dose Route Frequency    0.45% sodium chloride infusion  50 mL/hr IntraVENous CONTINUOUS    0.9% sodium chloride infusion 250 mL  250 mL IntraVENous PRN    sodium chloride (NS) flush 5-40 mL  5-40 mL IntraVENous Q8H    sodium chloride (NS) flush 5-40 mL  5-40 mL IntraVENous PRN    acetaminophen (TYLENOL) tablet 650 mg  650 mg Oral Q6H PRN    Or    acetaminophen (TYLENOL) suppository 650 mg  650 mg Rectal Q6H PRN    polyethylene glycol (MIRALAX) packet 17 g  17 g Oral DAILY PRN    promethazine (PHENERGAN) tablet 12.5 mg  12.5 mg Oral Q6H PRN    Or    ondansetron (ZOFRAN) injection 4 mg  4 mg IntraVENous Q6H PRN    enoxaparin (LOVENOX) injection 30 mg  30 mg SubCUTAneous Q12H    memantine (NAMENDA) tablet 10 mg  10 mg Oral QHS    donepeziL (ARICEPT) tablet 5 mg  5 mg Oral QHS    zinc sulfate tablet 220 mg  220 mg Oral DAILY    ascorbic acid (vitamin C) (VITAMIN C) tablet 1,000 mg  1,000 mg Oral BID    dexAMETHasone (DECADRON) tablet 6 mg  6 mg Oral DAILY    pantoprazole (PROTONIX) tablet 40 mg  40 mg Oral ACB    remdesivir 100 mg in 0.9% sodium chloride 250 mL IVPB  100 mg IntraVENous Q24H       Discharge Info:   Current Discharge Medication List      START taking these medications    Details   dexAMETHasone (DECADRON) 6 mg tablet Take 1 Tab by mouth Daily (before breakfast) for 5 days. Qty: 5 Tab, Refills: 0      pantoprazole (PROTONIX) 40 mg tablet Take 1 Tab by mouth daily for 30 days. Qty: 30 Tab, Refills: 0         CONTINUE these medications which have NOT CHANGED    Details   memantine (NAMENDA) 10 mg tablet Take 10 mg by mouth nightly. Time spent in patient discharge planning and coordination 35 minutes.     Signed:  Kareen Santos Santi Jenkins MD .

## 2022-03-18 PROBLEM — J96.01 ACUTE RESPIRATORY FAILURE WITH HYPOXIA (HCC): Status: ACTIVE | Noted: 2020-11-17

## 2022-03-18 PROBLEM — R07.9 CHEST PAIN: Status: ACTIVE | Noted: 2019-12-19

## 2022-03-19 PROBLEM — U07.1 PNEUMONIA DUE TO COVID-19 VIRUS: Status: ACTIVE | Noted: 2020-11-17

## 2022-03-19 PROBLEM — J12.82 PNEUMONIA DUE TO COVID-19 VIRUS: Status: ACTIVE | Noted: 2020-11-17

## 2022-03-19 PROBLEM — F02.80 ALZHEIMER DISEASE (HCC): Status: ACTIVE | Noted: 2020-11-17

## 2022-03-19 PROBLEM — G30.9 ALZHEIMER DISEASE (HCC): Status: ACTIVE | Noted: 2020-11-17

## 2024-06-25 ENCOUNTER — APPOINTMENT (OUTPATIENT)
Dept: GENERAL RADIOLOGY | Age: 84
DRG: 481 | End: 2024-06-25
Payer: MEDICARE

## 2024-06-25 ENCOUNTER — ANESTHESIA EVENT (OUTPATIENT)
Dept: SURGERY | Age: 84
End: 2024-06-25
Payer: MEDICARE

## 2024-06-25 ENCOUNTER — HOSPITAL ENCOUNTER (INPATIENT)
Age: 84
LOS: 7 days | Discharge: NURSING, REHAB OR LONG-TERM CARE | DRG: 481 | End: 2024-07-02
Attending: EMERGENCY MEDICINE | Admitting: STUDENT IN AN ORGANIZED HEALTH CARE EDUCATION/TRAINING PROGRAM
Payer: MEDICARE

## 2024-06-25 ENCOUNTER — APPOINTMENT (OUTPATIENT)
Dept: CT IMAGING | Age: 84
DRG: 481 | End: 2024-06-25
Payer: MEDICARE

## 2024-06-25 DIAGNOSIS — S72.001A CLOSED RIGHT HIP FRACTURE, INITIAL ENCOUNTER (HCC): ICD-10-CM

## 2024-06-25 DIAGNOSIS — S09.90XA CLOSED HEAD INJURY, INITIAL ENCOUNTER: ICD-10-CM

## 2024-06-25 DIAGNOSIS — S72.001A CLOSED FRACTURE OF NECK OF RIGHT FEMUR, INITIAL ENCOUNTER (HCC): Primary | ICD-10-CM

## 2024-06-25 PROBLEM — U07.1 PNEUMONIA DUE TO COVID-19 VIRUS: Status: RESOLVED | Noted: 2020-11-17 | Resolved: 2024-06-25

## 2024-06-25 PROBLEM — J12.82 PNEUMONIA DUE TO COVID-19 VIRUS: Status: RESOLVED | Noted: 2020-11-17 | Resolved: 2024-06-25

## 2024-06-25 PROBLEM — R07.9 CHEST PAIN: Status: RESOLVED | Noted: 2019-12-19 | Resolved: 2024-06-25

## 2024-06-25 PROBLEM — J96.01 ACUTE RESPIRATORY FAILURE WITH HYPOXIA (HCC): Status: RESOLVED | Noted: 2020-11-17 | Resolved: 2024-06-25

## 2024-06-25 LAB
ALBUMIN SERPL-MCNC: 3.7 G/DL (ref 3.2–4.6)
ALBUMIN/GLOB SERPL: 1 (ref 1–1.9)
ALP SERPL-CCNC: 125 U/L (ref 40–129)
ALT SERPL-CCNC: 19 U/L (ref 12–65)
ANION GAP SERPL CALC-SCNC: 14 MMOL/L (ref 9–18)
AST SERPL-CCNC: 34 U/L (ref 15–37)
BASOPHILS # BLD: 0.1 K/UL (ref 0–0.2)
BASOPHILS NFR BLD: 0 % (ref 0–2)
BILIRUB SERPL-MCNC: 1.2 MG/DL (ref 0–1.2)
BUN SERPL-MCNC: 18 MG/DL (ref 8–23)
CALCIUM SERPL-MCNC: 9.6 MG/DL (ref 8.8–10.2)
CHLORIDE SERPL-SCNC: 102 MMOL/L (ref 98–107)
CK SERPL-CCNC: 88 U/L (ref 21–215)
CO2 SERPL-SCNC: 22 MMOL/L (ref 20–28)
CREAT SERPL-MCNC: 1.01 MG/DL (ref 0.8–1.3)
DIFFERENTIAL METHOD BLD: ABNORMAL
EKG ATRIAL RATE: 83 BPM
EKG DIAGNOSIS: NORMAL
EKG Q-T INTERVAL: 369 MS
EKG QRS DURATION: 85 MS
EKG QTC CALCULATION (BAZETT): 421 MS
EKG R AXIS: 105 DEGREES
EKG T AXIS: 107 DEGREES
EKG VENTRICULAR RATE: 83 BPM
EOSINOPHIL # BLD: 0.1 K/UL (ref 0–0.8)
EOSINOPHIL NFR BLD: 0 % (ref 0.5–7.8)
ERYTHROCYTE [DISTWIDTH] IN BLOOD BY AUTOMATED COUNT: 14.6 % (ref 11.9–14.6)
GLOBULIN SER CALC-MCNC: 3.6 G/DL (ref 2.3–3.5)
GLUCOSE SERPL-MCNC: 112 MG/DL (ref 70–99)
HCT VFR BLD AUTO: 43.5 % (ref 41.1–50.3)
HGB BLD-MCNC: 13.7 G/DL (ref 13.6–17.2)
IMM GRANULOCYTES # BLD AUTO: 0.2 K/UL (ref 0–0.5)
IMM GRANULOCYTES NFR BLD AUTO: 1 % (ref 0–5)
LYMPHOCYTES # BLD: 1.2 K/UL (ref 0.5–4.6)
LYMPHOCYTES NFR BLD: 6 % (ref 13–44)
MCH RBC QN AUTO: 26.7 PG (ref 26.1–32.9)
MCHC RBC AUTO-ENTMCNC: 31.5 G/DL (ref 31.4–35)
MCV RBC AUTO: 84.8 FL (ref 82–102)
MONOCYTES # BLD: 0.8 K/UL (ref 0.1–1.3)
MONOCYTES NFR BLD: 4 % (ref 4–12)
NEUTS SEG # BLD: 17.6 K/UL (ref 1.7–8.2)
NEUTS SEG NFR BLD: 88 % (ref 43–78)
NRBC # BLD: 0 K/UL (ref 0–0.2)
PLATELET # BLD AUTO: 942 K/UL (ref 150–450)
PMV BLD AUTO: 9.4 FL (ref 9.4–12.3)
POTASSIUM SERPL-SCNC: 4.9 MMOL/L (ref 3.5–5.1)
PROT SERPL-MCNC: 7.3 G/DL (ref 6.3–8.2)
RBC # BLD AUTO: 5.13 M/UL (ref 4.23–5.6)
SODIUM SERPL-SCNC: 137 MMOL/L (ref 136–145)
WBC # BLD AUTO: 19.9 K/UL (ref 4.3–11.1)

## 2024-06-25 PROCEDURE — 70450 CT HEAD/BRAIN W/O DYE: CPT

## 2024-06-25 PROCEDURE — 82550 ASSAY OF CK (CPK): CPT

## 2024-06-25 PROCEDURE — 93010 ELECTROCARDIOGRAM REPORT: CPT | Performed by: INTERNAL MEDICINE

## 2024-06-25 PROCEDURE — 80053 COMPREHEN METABOLIC PANEL: CPT

## 2024-06-25 PROCEDURE — 6360000002 HC RX W HCPCS: Performed by: STUDENT IN AN ORGANIZED HEALTH CARE EDUCATION/TRAINING PROGRAM

## 2024-06-25 PROCEDURE — 85025 COMPLETE CBC W/AUTO DIFF WBC: CPT

## 2024-06-25 PROCEDURE — 2580000003 HC RX 258: Performed by: EMERGENCY MEDICINE

## 2024-06-25 PROCEDURE — 71045 X-RAY EXAM CHEST 1 VIEW: CPT

## 2024-06-25 PROCEDURE — 99285 EMERGENCY DEPT VISIT HI MDM: CPT

## 2024-06-25 PROCEDURE — 73552 X-RAY EXAM OF FEMUR 2/>: CPT

## 2024-06-25 PROCEDURE — 1100000000 HC RM PRIVATE

## 2024-06-25 PROCEDURE — 73502 X-RAY EXAM HIP UNI 2-3 VIEWS: CPT

## 2024-06-25 PROCEDURE — 96374 THER/PROPH/DIAG INJ IV PUSH: CPT

## 2024-06-25 PROCEDURE — 6360000002 HC RX W HCPCS: Performed by: EMERGENCY MEDICINE

## 2024-06-25 PROCEDURE — 93005 ELECTROCARDIOGRAM TRACING: CPT | Performed by: EMERGENCY MEDICINE

## 2024-06-25 PROCEDURE — 2580000003 HC RX 258: Performed by: STUDENT IN AN ORGANIZED HEALTH CARE EDUCATION/TRAINING PROGRAM

## 2024-06-25 PROCEDURE — 6370000000 HC RX 637 (ALT 250 FOR IP): Performed by: STUDENT IN AN ORGANIZED HEALTH CARE EDUCATION/TRAINING PROGRAM

## 2024-06-25 PROCEDURE — 72125 CT NECK SPINE W/O DYE: CPT

## 2024-06-25 RX ORDER — MORPHINE SULFATE 4 MG/ML
4 INJECTION, SOLUTION INTRAMUSCULAR; INTRAVENOUS ONCE
Status: CANCELLED | OUTPATIENT
Start: 2024-06-25 | End: 2024-06-25

## 2024-06-25 RX ORDER — SODIUM CHLORIDE 9 MG/ML
INJECTION, SOLUTION INTRAVENOUS PRN
Status: DISCONTINUED | OUTPATIENT
Start: 2024-06-25 | End: 2024-07-02 | Stop reason: HOSPADM

## 2024-06-25 RX ORDER — ACETAMINOPHEN 325 MG/1
650 TABLET ORAL EVERY 6 HOURS PRN
Status: DISCONTINUED | OUTPATIENT
Start: 2024-06-25 | End: 2024-07-02 | Stop reason: HOSPADM

## 2024-06-25 RX ORDER — POTASSIUM CHLORIDE 7.45 MG/ML
10 INJECTION INTRAVENOUS PRN
Status: DISCONTINUED | OUTPATIENT
Start: 2024-06-25 | End: 2024-07-02 | Stop reason: HOSPADM

## 2024-06-25 RX ORDER — SODIUM CHLORIDE 0.9 % (FLUSH) 0.9 %
5-40 SYRINGE (ML) INJECTION PRN
Status: DISCONTINUED | OUTPATIENT
Start: 2024-06-25 | End: 2024-07-02 | Stop reason: HOSPADM

## 2024-06-25 RX ORDER — QUETIAPINE FUMARATE 25 MG/1
25 TABLET, FILM COATED ORAL 2 TIMES DAILY PRN
Status: DISCONTINUED | OUTPATIENT
Start: 2024-06-25 | End: 2024-07-02 | Stop reason: HOSPADM

## 2024-06-25 RX ORDER — ONDANSETRON 2 MG/ML
4 INJECTION INTRAMUSCULAR; INTRAVENOUS EVERY 6 HOURS PRN
Status: DISCONTINUED | OUTPATIENT
Start: 2024-06-25 | End: 2024-07-02 | Stop reason: HOSPADM

## 2024-06-25 RX ORDER — MAGNESIUM SULFATE IN WATER 40 MG/ML
2000 INJECTION, SOLUTION INTRAVENOUS PRN
Status: DISCONTINUED | OUTPATIENT
Start: 2024-06-25 | End: 2024-07-02 | Stop reason: HOSPADM

## 2024-06-25 RX ORDER — LANOLIN ALCOHOL/MO/W.PET/CERES
3 CREAM (GRAM) TOPICAL
COMMUNITY
Start: 2023-10-09

## 2024-06-25 RX ORDER — ACETAMINOPHEN 650 MG/1
650 SUPPOSITORY RECTAL EVERY 6 HOURS PRN
Status: DISCONTINUED | OUTPATIENT
Start: 2024-06-25 | End: 2024-07-02 | Stop reason: HOSPADM

## 2024-06-25 RX ORDER — 0.9 % SODIUM CHLORIDE 0.9 %
1000 INTRAVENOUS SOLUTION INTRAVENOUS ONCE
Status: COMPLETED | OUTPATIENT
Start: 2024-06-25 | End: 2024-06-25

## 2024-06-25 RX ORDER — SODIUM CHLORIDE 0.9 % (FLUSH) 0.9 %
5-40 SYRINGE (ML) INJECTION EVERY 12 HOURS SCHEDULED
Status: DISCONTINUED | OUTPATIENT
Start: 2024-06-25 | End: 2024-07-02 | Stop reason: HOSPADM

## 2024-06-25 RX ORDER — MORPHINE SULFATE 4 MG/ML
4 INJECTION, SOLUTION INTRAMUSCULAR; INTRAVENOUS EVERY 4 HOURS PRN
Status: DISCONTINUED | OUTPATIENT
Start: 2024-06-25 | End: 2024-06-27

## 2024-06-25 RX ORDER — QUETIAPINE FUMARATE 25 MG/1
25 TABLET, FILM COATED ORAL 2 TIMES DAILY PRN
Status: ON HOLD | COMMUNITY
Start: 2023-10-09 | End: 2024-07-02

## 2024-06-25 RX ORDER — POTASSIUM CHLORIDE 20 MEQ/1
40 TABLET, EXTENDED RELEASE ORAL PRN
Status: DISCONTINUED | OUTPATIENT
Start: 2024-06-25 | End: 2024-07-02 | Stop reason: HOSPADM

## 2024-06-25 RX ORDER — SODIUM CHLORIDE 9 MG/ML
INJECTION, SOLUTION INTRAVENOUS CONTINUOUS
Status: DISCONTINUED | OUTPATIENT
Start: 2024-06-25 | End: 2024-06-27

## 2024-06-25 RX ORDER — POLYETHYLENE GLYCOL 3350 17 G/17G
17 POWDER, FOR SOLUTION ORAL DAILY PRN
Status: DISCONTINUED | OUTPATIENT
Start: 2024-06-25 | End: 2024-06-28

## 2024-06-25 RX ORDER — ONDANSETRON 4 MG/1
4 TABLET, ORALLY DISINTEGRATING ORAL EVERY 8 HOURS PRN
Status: DISCONTINUED | OUTPATIENT
Start: 2024-06-25 | End: 2024-07-02 | Stop reason: HOSPADM

## 2024-06-25 RX ADMIN — MORPHINE SULFATE 4 MG: 4 INJECTION INTRAVENOUS at 15:08

## 2024-06-25 RX ADMIN — FENTANYL CITRATE 50 MCG: 50 INJECTION INTRAMUSCULAR; INTRAVENOUS at 14:36

## 2024-06-25 RX ADMIN — ACETAMINOPHEN 650 MG: 325 TABLET ORAL at 22:34

## 2024-06-25 RX ADMIN — SODIUM CHLORIDE 1000 ML: 9 INJECTION, SOLUTION INTRAVENOUS at 14:36

## 2024-06-25 RX ADMIN — SODIUM CHLORIDE: 9 INJECTION, SOLUTION INTRAVENOUS at 17:41

## 2024-06-25 ASSESSMENT — PAIN SCALES - PAIN ASSESSMENT IN ADVANCED DEMENTIA (PAINAD)
CONSOLABILITY: NO NEED TO CONSOLE
BODYLANGUAGE: TENSE, DISTRESSED PACING, FIDGETING
BREATHING: NORMAL
CONSOLABILITY: DISTRACTED OR REASSURED BY VOICE/TOUCH
BREATHING: NORMAL
FACIALEXPRESSION: SAD, FRIGHTENED, FROWN
BODYLANGUAGE: RELAXED
FACIALEXPRESSION: SMILING OR INEXPRESSIVE
TOTALSCORE: 0
TOTALSCORE: 4
NEGVOCALIZATION: OCCASIONAL MOAN/GROAN, LOW SPEECH, NEGATIVE/DISAPPROVING QUALITY

## 2024-06-25 ASSESSMENT — LIFESTYLE VARIABLES
HOW MANY STANDARD DRINKS CONTAINING ALCOHOL DO YOU HAVE ON A TYPICAL DAY: PATIENT DOES NOT DRINK
HOW OFTEN DO YOU HAVE A DRINK CONTAINING ALCOHOL: NEVER

## 2024-06-25 ASSESSMENT — PAIN SCALES - GENERAL
PAINLEVEL_OUTOF10: 0
PAINLEVEL_OUTOF10: 4

## 2024-06-25 NOTE — ACP (ADVANCE CARE PLANNING)
[facility] Hospitalist Service  At the heart of better care     Advance Care Planning   Admit Date:  2024 12:49 PM   Name:  Jah Verdugo   Age:  84 y.o.  Sex:  male  :  1940   MRN:  [id number]   Room:  ER30[facility]    Jah Verdugo has capacity to make his own decisions:   No    If pt unable to make decisions, POA/surrogate decision maker:  Daughter    Other people present:   Daughter    Patient / surrogate decision-maker directed code status:  DNR/DNI    Other ACP topics discussed, if applicable:   None    Patient or surrogate consented to discussion of the current conditions, workup, management plans, prognosis, and the risk for further deterioration.  Time spent: 17 minutes in direct discussion.      Signed:  Smith Franco MD

## 2024-06-25 NOTE — ED TRIAGE NOTES
Patient arrives to ED pov with daughter. Patient had unwitnessed fall last night at [facility]. Patient was unable to stand on his right leg today and complaining of pain. Patient has history of alzheimer. Patient is not on blood thinners. Unsure if patient hit head.

## 2024-06-25 NOTE — ANESTHESIA PRE PROCEDURE
04:16 AM    ALT 16 06/26/2024 04:16 AM       POC Tests:   Recent Labs     06/26/24  0556   POCGLU 89       Coags: No results found for: \"PROTIME\", \"INR\", \"APTT\"    HCG (If Applicable): No results found for: \"PREGTESTUR\", \"PREGSERUM\", \"HCG\", \"HCGQUANT\"     ABGs: No results found for: \"PHART\", \"PO2ART\", \"FMJ4TLO\", \"CRM2OBZ\", \"BEART\", \"I6QSRGGE\"     Type & Screen (If Applicable):  No results found for: \"LABABO\"    Drug/Infectious Status (If Applicable):  No results found for: \"HIV\", \"HEPCAB\"    COVID-19 Screening (If Applicable):   Lab Results   Component Value Date/Time    COVID19 Detected 11/16/2020 10:41 PM           Anesthesia Evaluation  Patient summary reviewed and Nursing notes reviewed   no history of anesthetic complications:   Airway: Mallampati: Unable to assess / NA       Mouth opening: > = 3 FB   Dental:      Comment: Unable to assess due to patient cooperation    Pulmonary:Negative Pulmonary ROS and normal exam                               Cardiovascular:    (+) dysrhythmias: PVC               ROS comment: Possible afib?     Neuro/Psych:   (+) dementia (Alzheimer disease)             ROS comment: Long h/o EtOH abuse GI/Hepatic/Renal:   (+) PUD          Endo/Other:    (+) blood dyscrasia (thrombocytosis)::..                  ROS comment: S/p fall 6/24 Abdominal:             Vascular: negative vascular ROS.         Other Findings:           Anesthesia Plan      general     ASA 3     (LMA)  Induction: intravenous.    MIPS: Postoperative opioids intended.  Anesthetic plan and risks discussed with child/children and healthcare power of .    Use of blood products discussed with healthcare power of  and child/children whom consented to blood products.                    Addie Scott MD   6/26/2024

## 2024-06-25 NOTE — ED PROVIDER NOTES
the brain without  intravenous contrast.  Radiation dose reduction techniques were used for this  study:  All CT scans performed at this facility use one or all of the following:  Automated exposure control, adjustment of the mA and/or kVp according to  patient's size, iterative reconstruction.    COMPARISON: None    FINDINGS: Moderate atrophic, periventricular, and deep white matter changes  without evidence of acute hemorrhage, midline shift or mass effect. No skull  fracture or scalp hematoma. Minimal mucosal thickening noted in the ethmoid  sinuses, and there is irregularity in the left nasal bone to suspect a fracture.      Impression    Atrophic periventricular deep white matter changes, no acute  hemorrhage    Nasal fracture suspected    Ethmoid sinusitis    Electronically signed by EKLECHI SUERO   CT CERVICAL SPINE WO CONTRAST    Narrative    CT of the Cervical Spine    INDICATION: Neck pain after a fall    TECHNIQUE: [id number]  axial images were obtained through the cervical spine  without intravenous contrast. Coronal and sagittal reformatted images were also  reviewed.  Radiation dose reduction techniques were used for this study:  All CT  scans performed at this facility use one or all of the following: Automated  exposure control, adjustment of the mA and/or kVp according to patient's size,  iterative reconstruction.    COMPARISON: None    FINDINGS:    The bones are diffusely demineralized.    There is anatomic alignment of the cervical spine without fracture or suspicious  osseous lesion. Intervertebral disc space narrowing noted throughout the  cervical spine.    There is a normal relationship between C7 and T1. No upper rib fracture or  pneumothorax.          Impression    Multilevel degenerative changes, no acute findings      Electronically signed by KELECHI SUERO   XR FEMUR RIGHT (MIN 2 VIEWS)    Narrative    Right femur    INDICATION: Acute pain after trauma    COMPARISON:

## 2024-06-25 NOTE — H&P
Hospitalist History and Physical   Admit Date:  2024 12:49 PM   Name:  Jah Verdugo   Age:  84 y.o.  Sex:  male  :  1940   MRN:  [id number]   Room:  ER30/30    Presenting/Chief Complaint: Fall     Reason(s) for Admission: Closed fracture of neck of right femur, initial encounter ([facility]) [S72.001A]     History of Present Illness:   Jah Verdugo is a 84 y.o. male with medical history of Alzheimer's dementia who presented after an unwitnessed fall last night.  Patient [facility] a [facility].  Patient was unfortunately found down in his room early this morning.  X-ray of patient's femur showed right acute impacted subcapital femoral neck fracture.  ED provider contacted orthopedic surgery who asked hospitalist to admit for potential intervention following day.  CT head without any acute intracranial abnormality.  CT spine with no C-spine fracture.  Patient's status post fentanyl and IV hydration in the emergency department.  WBC of 19.9, CK of 88, platelet count of 942.      Assessment & Plan:   Closed fracture of neck of right femur, initial encounter ([facility])  -Ortho consulted  - SCDs  - Analgesic with morphine 4 mg every 4 hours  - N.p.o. after midnight  - Hold all blood thinners  - PT/OT  - DVT prophylaxis per Ortho status post intervention tomorrow     Alzheimer's dementia  - Per daughter in room patient's only medication is Seroquel 25 mg twice daily as needed for agitation      PT/OT evals ordered?  Therapy evals ordered  Diet: No diet orders on file  VTE prophylaxis: SCD's   Code status: No Order      Non-peripheral Lines and Tubes (if present):             Hospital Problems:  Principal Problem:    Closed fracture of neck of right femur, initial encounter ([facility])  Active Problems:    Closed right hip fracture, initial encounter ([facility])  Resolved Problems:    * No resolved hospital problems. *        Objective:   Patient Vitals for the past 24 hrs:   Temp Pulse Resp BP SpO2   24 1415 --

## 2024-06-25 NOTE — ED NOTES
TRANSFER - OUT REPORT:    Verbal report given to aTbatha UP on Jah Verdugo  being transferred to  724 for routine progression of patient care       Report consisted of patient's Situation, Background, Assessment and   Recommendations(SBAR).     Information from the following report(s) ED Encounter Summary, ED SBAR, MAR, and Recent Results was reviewed with the receiving nurse.    [facility] Fall Assessment:    Presents to emergency department  because of falls (Syncope, seizure, or loss of consciousness): Yes  Age > 70: Yes  Altered Mental Status, Intoxication with alcohol or substance confusion (Disorientation, impaired judgment, poor safety awaremess, or inability to follow instructions): No  Impaired Mobility: Ambulates or transfers with assistive devices or assistance; Unable to ambulate or transer.: No             Lines:   Peripheral IV 06/25/24 Distal;Right Forearm (Active)   Site Assessment Clean, dry & intact 06/25/24 1329   Line Status Blood return noted 06/25/24 1329   Phlebitis Assessment No symptoms 06/25/24 1329   Infiltration Assessment 0 06/25/24 1329        Opportunity for questions and clarification was provided.      Patient transported with:  O2 @ 3lpm and Ty Coleman RN  06/25/24 6533

## 2024-06-26 ENCOUNTER — APPOINTMENT (OUTPATIENT)
Dept: GENERAL RADIOLOGY | Age: 84
DRG: 481 | End: 2024-06-26
Payer: MEDICARE

## 2024-06-26 ENCOUNTER — ANESTHESIA (OUTPATIENT)
Dept: SURGERY | Age: 84
End: 2024-06-26
Payer: MEDICARE

## 2024-06-26 LAB
ABO + RH BLD: NORMAL
ALBUMIN SERPL-MCNC: 2.9 G/DL (ref 3.2–4.6)
ALBUMIN/GLOB SERPL: 1 (ref 1–1.9)
ALP SERPL-CCNC: 97 U/L (ref 40–129)
ALT SERPL-CCNC: 16 U/L (ref 12–65)
ANION GAP SERPL CALC-SCNC: 10 MMOL/L (ref 9–18)
APPEARANCE UR: ABNORMAL
AST SERPL-CCNC: 42 U/L (ref 15–37)
BACTERIA URNS QL MICRO: ABNORMAL /HPF
BASOPHILS # BLD: 0.1 K/UL (ref 0–0.2)
BASOPHILS NFR BLD: 1 % (ref 0–2)
BILIRUB SERPL-MCNC: 1.3 MG/DL (ref 0–1.2)
BILIRUB UR QL: NEGATIVE
BLOOD GROUP ANTIBODIES SERPL: NORMAL
BUN SERPL-MCNC: 20 MG/DL (ref 8–23)
CALCIUM SERPL-MCNC: 8.8 MG/DL (ref 8.8–10.2)
CASTS URNS QL MICRO: ABNORMAL /LPF
CHLORIDE SERPL-SCNC: 108 MMOL/L (ref 98–107)
CO2 SERPL-SCNC: 22 MMOL/L (ref 20–28)
COLOR UR: ABNORMAL
CREAT SERPL-MCNC: 1.11 MG/DL (ref 0.8–1.3)
DIFFERENTIAL METHOD BLD: ABNORMAL
EOSINOPHIL # BLD: 0.7 K/UL (ref 0–0.8)
EOSINOPHIL NFR BLD: 5 % (ref 0.5–7.8)
ERYTHROCYTE [DISTWIDTH] IN BLOOD BY AUTOMATED COUNT: 15.1 % (ref 11.9–14.6)
GLOBULIN SER CALC-MCNC: 2.9 G/DL (ref 2.3–3.5)
GLUCOSE BLD STRIP.AUTO-MCNC: 80 MG/DL (ref 65–100)
GLUCOSE BLD STRIP.AUTO-MCNC: 89 MG/DL (ref 65–100)
GLUCOSE SERPL-MCNC: 93 MG/DL (ref 70–99)
GLUCOSE UR STRIP.AUTO-MCNC: NEGATIVE MG/DL
HCT VFR BLD AUTO: 38.7 % (ref 41.1–50.3)
HGB BLD-MCNC: 12.3 G/DL (ref 13.6–17.2)
HGB UR QL STRIP: ABNORMAL
IMM GRANULOCYTES # BLD AUTO: 0.1 K/UL (ref 0–0.5)
IMM GRANULOCYTES NFR BLD AUTO: 1 % (ref 0–5)
KETONES UR QL STRIP.AUTO: NEGATIVE MG/DL
LEUKOCYTE ESTERASE UR QL STRIP.AUTO: ABNORMAL
LYMPHOCYTES # BLD: 1.3 K/UL (ref 0.5–4.6)
LYMPHOCYTES NFR BLD: 8 % (ref 13–44)
MCH RBC QN AUTO: 27.2 PG (ref 26.1–32.9)
MCHC RBC AUTO-ENTMCNC: 31.8 G/DL (ref 31.4–35)
MCV RBC AUTO: 85.6 FL (ref 82–102)
MONOCYTES # BLD: 0.7 K/UL (ref 0.1–1.3)
MONOCYTES NFR BLD: 4 % (ref 4–12)
NEUTS SEG # BLD: 12.6 K/UL (ref 1.7–8.2)
NEUTS SEG NFR BLD: 81 % (ref 43–78)
NITRITE UR QL STRIP.AUTO: NEGATIVE
NRBC # BLD: 0 K/UL (ref 0–0.2)
OTHER OBSERVATIONS: ABNORMAL
PH UR STRIP: 6 (ref 5–9)
PLATELET # BLD AUTO: 768 K/UL (ref 150–450)
PMV BLD AUTO: 9.6 FL (ref 9.4–12.3)
POTASSIUM SERPL-SCNC: 4.8 MMOL/L (ref 3.5–5.1)
PROT SERPL-MCNC: 5.8 G/DL (ref 6.3–8.2)
PROT UR STRIP-MCNC: ABNORMAL MG/DL
RBC # BLD AUTO: 4.52 M/UL (ref 4.23–5.6)
SERVICE CMNT-IMP: NORMAL
SERVICE CMNT-IMP: NORMAL
SODIUM SERPL-SCNC: 140 MMOL/L (ref 136–145)
SP GR UR REFRACTOMETRY: 1.01 (ref 1–1.02)
SPECIMEN EXP DATE BLD: NORMAL
UROBILINOGEN UR QL STRIP.AUTO: 1 EU/DL (ref 0.2–1)
WBC # BLD AUTO: 15.5 K/UL (ref 4.3–11.1)
WBC URNS QL MICRO: >100 /HPF

## 2024-06-26 PROCEDURE — C1769 GUIDE WIRE: HCPCS | Performed by: ORTHOPAEDIC SURGERY

## 2024-06-26 PROCEDURE — 2580000003 HC RX 258: Performed by: STUDENT IN AN ORGANIZED HEALTH CARE EDUCATION/TRAINING PROGRAM

## 2024-06-26 PROCEDURE — 86900 BLOOD TYPING SEROLOGIC ABO: CPT

## 2024-06-26 PROCEDURE — 6360000002 HC RX W HCPCS: Performed by: INTERNAL MEDICINE

## 2024-06-26 PROCEDURE — 2720000010 HC SURG SUPPLY STERILE: Performed by: ORTHOPAEDIC SURGERY

## 2024-06-26 PROCEDURE — 7100000001 HC PACU RECOVERY - ADDTL 15 MIN: Performed by: ORTHOPAEDIC SURGERY

## 2024-06-26 PROCEDURE — 3600000014 HC SURGERY LEVEL 4 ADDTL 15MIN: Performed by: ORTHOPAEDIC SURGERY

## 2024-06-26 PROCEDURE — 27236 TREAT THIGH FRACTURE: CPT | Performed by: ORTHOPAEDIC SURGERY

## 2024-06-26 PROCEDURE — 2580000003 HC RX 258: Performed by: NURSE ANESTHETIST, CERTIFIED REGISTERED

## 2024-06-26 PROCEDURE — 6360000002 HC RX W HCPCS: Performed by: NURSE ANESTHETIST, CERTIFIED REGISTERED

## 2024-06-26 PROCEDURE — 82962 GLUCOSE BLOOD TEST: CPT

## 2024-06-26 PROCEDURE — 81001 URINALYSIS AUTO W/SCOPE: CPT

## 2024-06-26 PROCEDURE — 73502 X-RAY EXAM HIP UNI 2-3 VIEWS: CPT

## 2024-06-26 PROCEDURE — 6360000002 HC RX W HCPCS: Performed by: ORTHOPAEDIC SURGERY

## 2024-06-26 PROCEDURE — 51798 US URINE CAPACITY MEASURE: CPT

## 2024-06-26 PROCEDURE — 7100000000 HC PACU RECOVERY - FIRST 15 MIN: Performed by: ORTHOPAEDIC SURGERY

## 2024-06-26 PROCEDURE — 2709999900 HC NON-CHARGEABLE SUPPLY: Performed by: ORTHOPAEDIC SURGERY

## 2024-06-26 PROCEDURE — 86850 RBC ANTIBODY SCREEN: CPT

## 2024-06-26 PROCEDURE — 1100000000 HC RM PRIVATE

## 2024-06-26 PROCEDURE — 6370000000 HC RX 637 (ALT 250 FOR IP): Performed by: ORTHOPAEDIC SURGERY

## 2024-06-26 PROCEDURE — C1713 ANCHOR/SCREW BN/BN,TIS/BN: HCPCS | Performed by: ORTHOPAEDIC SURGERY

## 2024-06-26 PROCEDURE — 6370000000 HC RX 637 (ALT 250 FOR IP): Performed by: INTERNAL MEDICINE

## 2024-06-26 PROCEDURE — 3700000001 HC ADD 15 MINUTES (ANESTHESIA): Performed by: ORTHOPAEDIC SURGERY

## 2024-06-26 PROCEDURE — 2580000003 HC RX 258: Performed by: INTERNAL MEDICINE

## 2024-06-26 PROCEDURE — 80053 COMPREHEN METABOLIC PANEL: CPT

## 2024-06-26 PROCEDURE — 2580000003 HC RX 258: Performed by: ORTHOPAEDIC SURGERY

## 2024-06-26 PROCEDURE — 2700000000 HC OXYGEN THERAPY PER DAY

## 2024-06-26 PROCEDURE — 85025 COMPLETE CBC W/AUTO DIFF WBC: CPT

## 2024-06-26 PROCEDURE — 2580000003 HC RX 258: Performed by: ANESTHESIOLOGY

## 2024-06-26 PROCEDURE — 2500000003 HC RX 250 WO HCPCS: Performed by: NURSE ANESTHETIST, CERTIFIED REGISTERED

## 2024-06-26 PROCEDURE — 36415 COLL VENOUS BLD VENIPUNCTURE: CPT

## 2024-06-26 PROCEDURE — 0QS604Z REPOSITION RIGHT UPPER FEMUR WITH INTERNAL FIXATION DEVICE, OPEN APPROACH: ICD-10-PCS | Performed by: ORTHOPAEDIC SURGERY

## 2024-06-26 PROCEDURE — 3600000004 HC SURGERY LEVEL 4 BASE: Performed by: ORTHOPAEDIC SURGERY

## 2024-06-26 PROCEDURE — 94760 N-INVAS EAR/PLS OXIMETRY 1: CPT

## 2024-06-26 PROCEDURE — 86901 BLOOD TYPING SEROLOGIC RH(D): CPT

## 2024-06-26 PROCEDURE — 3700000000 HC ANESTHESIA ATTENDED CARE: Performed by: ORTHOPAEDIC SURGERY

## 2024-06-26 DEVICE — PLATE BONE 1 H TI ALLOY FOR FEM NK SYS: Type: IMPLANTABLE DEVICE | Status: FUNCTIONAL

## 2024-06-26 DEVICE — BOLT ORTHOPEDIC 105 MM FOR FEM NK SYS TI STRL: Type: IMPLANTABLE DEVICE | Status: FUNCTIONAL

## 2024-06-26 DEVICE — SCREW BNE ANTIROTATION 105 MM TI STRL: Type: IMPLANTABLE DEVICE | Status: FUNCTIONAL

## 2024-06-26 DEVICE — SCREW BONE L33MM DIA5MM ST LCK W/ T25 STARDRV: Type: IMPLANTABLE DEVICE | Status: FUNCTIONAL

## 2024-06-26 RX ORDER — SODIUM CHLORIDE, SODIUM LACTATE, POTASSIUM CHLORIDE, CALCIUM CHLORIDE 600; 310; 30; 20 MG/100ML; MG/100ML; MG/100ML; MG/100ML
INJECTION, SOLUTION INTRAVENOUS CONTINUOUS
Status: DISCONTINUED | OUTPATIENT
Start: 2024-06-26 | End: 2024-06-26 | Stop reason: HOSPADM

## 2024-06-26 RX ORDER — LIDOCAINE HYDROCHLORIDE 10 MG/ML
1 INJECTION, SOLUTION INFILTRATION; PERINEURAL
Status: DISCONTINUED | OUTPATIENT
Start: 2024-06-26 | End: 2024-06-26 | Stop reason: HOSPADM

## 2024-06-26 RX ORDER — HALOPERIDOL 5 MG/ML
1 INJECTION INTRAMUSCULAR
Status: DISCONTINUED | OUTPATIENT
Start: 2024-06-26 | End: 2024-06-26 | Stop reason: HOSPADM

## 2024-06-26 RX ORDER — SODIUM CHLORIDE 0.9 % (FLUSH) 0.9 %
5-40 SYRINGE (ML) INJECTION EVERY 12 HOURS SCHEDULED
Status: DISCONTINUED | OUTPATIENT
Start: 2024-06-26 | End: 2024-07-02 | Stop reason: HOSPADM

## 2024-06-26 RX ORDER — ONDANSETRON 2 MG/ML
INJECTION INTRAMUSCULAR; INTRAVENOUS PRN
Status: DISCONTINUED | OUTPATIENT
Start: 2024-06-26 | End: 2024-06-26 | Stop reason: SDUPTHER

## 2024-06-26 RX ORDER — ONDANSETRON 2 MG/ML
4 INJECTION INTRAMUSCULAR; INTRAVENOUS EVERY 6 HOURS PRN
Status: DISCONTINUED | OUTPATIENT
Start: 2024-06-26 | End: 2024-07-02 | Stop reason: HOSPADM

## 2024-06-26 RX ORDER — HYDROMORPHONE HYDROCHLORIDE 1 MG/ML
0.5 INJECTION, SOLUTION INTRAMUSCULAR; INTRAVENOUS; SUBCUTANEOUS
Status: DISCONTINUED | OUTPATIENT
Start: 2024-06-26 | End: 2024-06-27

## 2024-06-26 RX ORDER — OXYCODONE HYDROCHLORIDE AND ACETAMINOPHEN 5; 325 MG/1; MG/1
1 TABLET ORAL EVERY 6 HOURS PRN
Status: DISCONTINUED | OUTPATIENT
Start: 2024-06-26 | End: 2024-06-27

## 2024-06-26 RX ORDER — NALOXONE HYDROCHLORIDE 0.4 MG/ML
INJECTION, SOLUTION INTRAMUSCULAR; INTRAVENOUS; SUBCUTANEOUS PRN
Status: DISCONTINUED | OUTPATIENT
Start: 2024-06-26 | End: 2024-06-26 | Stop reason: HOSPADM

## 2024-06-26 RX ORDER — SODIUM CHLORIDE 0.9 % (FLUSH) 0.9 %
5-40 SYRINGE (ML) INJECTION PRN
Status: DISCONTINUED | OUTPATIENT
Start: 2024-06-26 | End: 2024-07-02 | Stop reason: HOSPADM

## 2024-06-26 RX ORDER — DEXAMETHASONE SODIUM PHOSPHATE 4 MG/ML
INJECTION, SOLUTION INTRA-ARTICULAR; INTRALESIONAL; INTRAMUSCULAR; INTRAVENOUS; SOFT TISSUE PRN
Status: DISCONTINUED | OUTPATIENT
Start: 2024-06-26 | End: 2024-06-26 | Stop reason: SDUPTHER

## 2024-06-26 RX ORDER — ONDANSETRON 4 MG/1
4 TABLET, ORALLY DISINTEGRATING ORAL EVERY 8 HOURS PRN
Status: DISCONTINUED | OUTPATIENT
Start: 2024-06-26 | End: 2024-07-02 | Stop reason: HOSPADM

## 2024-06-26 RX ORDER — OXYCODONE HYDROCHLORIDE 5 MG/1
5 TABLET ORAL EVERY 4 HOURS PRN
Status: DISCONTINUED | OUTPATIENT
Start: 2024-06-26 | End: 2024-06-27

## 2024-06-26 RX ORDER — LIDOCAINE HYDROCHLORIDE 20 MG/ML
INJECTION, SOLUTION EPIDURAL; INFILTRATION; INTRACAUDAL; PERINEURAL PRN
Status: DISCONTINUED | OUTPATIENT
Start: 2024-06-26 | End: 2024-06-26 | Stop reason: SDUPTHER

## 2024-06-26 RX ORDER — OXYCODONE HYDROCHLORIDE 5 MG/1
5 TABLET ORAL
Status: DISCONTINUED | OUTPATIENT
Start: 2024-06-26 | End: 2024-06-26 | Stop reason: HOSPADM

## 2024-06-26 RX ORDER — ACETAMINOPHEN 500 MG
1000 TABLET ORAL ONCE
Status: DISCONTINUED | OUTPATIENT
Start: 2024-06-26 | End: 2024-06-26 | Stop reason: HOSPADM

## 2024-06-26 RX ORDER — IBUPROFEN 600 MG/1
1 TABLET ORAL PRN
Status: DISCONTINUED | OUTPATIENT
Start: 2024-06-26 | End: 2024-06-26 | Stop reason: HOSPADM

## 2024-06-26 RX ORDER — ASPIRIN 325 MG
325 TABLET, DELAYED RELEASE (ENTERIC COATED) ORAL 2 TIMES DAILY
Status: DISCONTINUED | OUTPATIENT
Start: 2024-06-26 | End: 2024-07-02 | Stop reason: HOSPADM

## 2024-06-26 RX ORDER — HYDROMORPHONE HYDROCHLORIDE 2 MG/ML
0.25 INJECTION, SOLUTION INTRAMUSCULAR; INTRAVENOUS; SUBCUTANEOUS EVERY 5 MIN PRN
Status: DISCONTINUED | OUTPATIENT
Start: 2024-06-26 | End: 2024-06-26 | Stop reason: HOSPADM

## 2024-06-26 RX ORDER — SODIUM CHLORIDE 0.9 % (FLUSH) 0.9 %
5-40 SYRINGE (ML) INJECTION EVERY 12 HOURS SCHEDULED
Status: DISCONTINUED | OUTPATIENT
Start: 2024-06-26 | End: 2024-06-26 | Stop reason: HOSPADM

## 2024-06-26 RX ORDER — ONDANSETRON 2 MG/ML
4 INJECTION INTRAMUSCULAR; INTRAVENOUS
Status: DISCONTINUED | OUTPATIENT
Start: 2024-06-26 | End: 2024-06-26 | Stop reason: HOSPADM

## 2024-06-26 RX ORDER — SODIUM CHLORIDE 9 MG/ML
INJECTION, SOLUTION INTRAVENOUS PRN
Status: DISCONTINUED | OUTPATIENT
Start: 2024-06-26 | End: 2024-07-02 | Stop reason: HOSPADM

## 2024-06-26 RX ORDER — SODIUM CHLORIDE 9 MG/ML
INJECTION, SOLUTION INTRAVENOUS PRN
Status: DISCONTINUED | OUTPATIENT
Start: 2024-06-26 | End: 2024-06-26 | Stop reason: HOSPADM

## 2024-06-26 RX ORDER — DEXTROSE MONOHYDRATE 100 MG/ML
INJECTION, SOLUTION INTRAVENOUS CONTINUOUS PRN
Status: DISCONTINUED | OUTPATIENT
Start: 2024-06-26 | End: 2024-06-26 | Stop reason: HOSPADM

## 2024-06-26 RX ORDER — PROPOFOL 10 MG/ML
INJECTION, EMULSION INTRAVENOUS PRN
Status: DISCONTINUED | OUTPATIENT
Start: 2024-06-26 | End: 2024-06-26 | Stop reason: SDUPTHER

## 2024-06-26 RX ORDER — SODIUM CHLORIDE 0.9 % (FLUSH) 0.9 %
5-40 SYRINGE (ML) INJECTION PRN
Status: DISCONTINUED | OUTPATIENT
Start: 2024-06-26 | End: 2024-06-26 | Stop reason: HOSPADM

## 2024-06-26 RX ADMIN — WATER 1000 MG: 1 INJECTION INTRAMUSCULAR; INTRAVENOUS; SUBCUTANEOUS at 11:46

## 2024-06-26 RX ADMIN — CEFAZOLIN 2000 MG: 10 INJECTION, POWDER, FOR SOLUTION INTRAVENOUS at 15:09

## 2024-06-26 RX ADMIN — OXYCODONE HYDROCHLORIDE AND ACETAMINOPHEN 1 TABLET: 5; 325 TABLET ORAL at 03:39

## 2024-06-26 RX ADMIN — SODIUM CHLORIDE, PRESERVATIVE FREE 5 ML: 5 INJECTION INTRAVENOUS at 21:46

## 2024-06-26 RX ADMIN — ASPIRIN 325 MG: 325 TABLET, COATED ORAL at 21:52

## 2024-06-26 RX ADMIN — SODIUM CHLORIDE: 9 INJECTION, SOLUTION INTRAVENOUS at 04:52

## 2024-06-26 RX ADMIN — DEXAMETHASONE SODIUM PHOSPHATE 4 MG: 4 INJECTION INTRA-ARTICULAR; INTRALESIONAL; INTRAMUSCULAR; INTRAVENOUS; SOFT TISSUE at 11:57

## 2024-06-26 RX ADMIN — Medication 3 AMPULE: at 09:29

## 2024-06-26 RX ADMIN — ONDANSETRON 4 MG: 2 INJECTION INTRAMUSCULAR; INTRAVENOUS at 11:57

## 2024-06-26 RX ADMIN — SODIUM CHLORIDE, POTASSIUM CHLORIDE, SODIUM LACTATE AND CALCIUM CHLORIDE: 600; 310; 30; 20 INJECTION, SOLUTION INTRAVENOUS at 09:29

## 2024-06-26 RX ADMIN — ASPIRIN 325 MG: 325 TABLET, COATED ORAL at 15:05

## 2024-06-26 RX ADMIN — SODIUM CHLORIDE, PRESERVATIVE FREE 10 ML: 5 INJECTION INTRAVENOUS at 15:06

## 2024-06-26 RX ADMIN — PHENYLEPHRINE HYDROCHLORIDE 200 MCG: 10 INJECTION INTRAVENOUS at 11:50

## 2024-06-26 RX ADMIN — LIDOCAINE HYDROCHLORIDE 60 MG: 20 INJECTION, SOLUTION EPIDURAL; INFILTRATION; INTRACAUDAL; PERINEURAL at 11:35

## 2024-06-26 RX ADMIN — PROPOFOL 100 MG: 10 INJECTION, EMULSION INTRAVENOUS at 11:35

## 2024-06-26 RX ADMIN — CEFAZOLIN 2000 MG: 10 INJECTION, POWDER, FOR SOLUTION INTRAVENOUS at 21:46

## 2024-06-26 ASSESSMENT — PAIN SCALES - PAIN ASSESSMENT IN ADVANCED DEMENTIA (PAINAD)
TOTALSCORE: 5
BODYLANGUAGE: TENSE, DISTRESSED PACING, FIDGETING
FACIALEXPRESSION: SMILING OR INEXPRESSIVE
NEGVOCALIZATION: OCCASIONAL MOAN/GROAN, LOW SPEECH, NEGATIVE/DISAPPROVING QUALITY
CONSOLABILITY: DISTRACTED OR REASSURED BY VOICE/TOUCH
BODYLANGUAGE: RELAXED
TOTALSCORE: 0
FACIALEXPRESSION: SAD, FRIGHTENED, FROWN
BREATHING: NORMAL
BREATHING: OCCASIONAL LABORED BREATHING, SHORT PERIOD OF HYPERVENTILATION
CONSOLABILITY: NO NEED TO CONSOLE

## 2024-06-26 ASSESSMENT — PAIN SCALES - GENERAL
PAINLEVEL_OUTOF10: 5
PAINLEVEL_OUTOF10: 0

## 2024-06-26 ASSESSMENT — PAIN - FUNCTIONAL ASSESSMENT
PAIN_FUNCTIONAL_ASSESSMENT: NONE - DENIES PAIN

## 2024-06-26 NOTE — ANESTHESIA POSTPROCEDURE EVALUATION
Department of Anesthesiology  Postprocedure Note    Patient: Jah Verdugo  MRN: [id number]  YOB: 1940  Date of evaluation: 6/26/2024    Procedure Summary       Date: 06/26/24 Room / Location: [facility] MAIN OR  / [facility] MAIN OR    Anesthesia Start: 1128 Anesthesia Stop: 1215    Procedure: HIP OPEN REDUCTION INTERNAL FIXATION FNS (Right: Leg Upper) Diagnosis:       Closed right hip fracture, initial encounter ([facility])      (Closed right hip fracture, initial encounter ([facility]) [S72.001A])    Providers: Licnoln Melendrez MD Responsible Provider: Addie Scott MD    Anesthesia Type: General ASA Status: 3            Anesthesia Type: General    Go Phase I: Go Score: 6    Go Phase II:      Anesthesia Post Evaluation    Patient location during evaluation: PACU  Patient participation: complete - patient participated  Level of consciousness: awake and alert  Airway patency: patent  Nausea: well controlled.  Cardiovascular status: acceptable.  Respiratory status: acceptable  Hydration status: stable  Pain management: adequate    No notable events documented.

## 2024-06-26 NOTE — OP NOTE
Operative Report    Patient: Jah Verdugo MRN: [id number]  SSN: [id number]    YOB: 1940  Age: 84 y.o.  Sex: male       Date of Surgery: June 26, 2024     History:  Jah Verdugo is a 84 y.o. male who fell and injured his right hip.  He was seen and found to have a femoral neck fracture.  This appeared to be sort of a varus fracture but it did seem to be impacted on his plain film.  For this reason I thought it was not unreasonable to try to treat this with operative fixation versus arthroplasty.  I did try to speak with his daughter at length regarding the nature of each procedure and sort of the risk and benefits associated with both of these.  His daughter really wanted to minimize the surgical intervention she put him through and I thought that was very reasonable with his medical history and with his dementia so we thought it was reasonable to try to treat this with plate and screw fixation versus arthroplasty.      I talked to the patient and/or their representative and explained the exact nature the procedure.  I also went through a detailed list of the material risks associated with  the procedure which included risk of bleeding, infection, injury to nearby structures, worsening the situation, as well as the risks associate with anesthesia and finally death.  Also talked with him regarding the benefits and alternatives to the procedure.    Preoperative Diagnosis: Right closed displaced impacted femoral neck fracture    Postoperative Diagnosis:   Right closed displaced impacted femoral neck fracture      Surgeon(s) and Role:     * Lincoln Melendrez MD - Primary    Anesthesia: Choice     Procedure: Open treatment of right femoral neck fracture with plate and screw fixation    Procedure in Detail: After the successful induction of general anesthesia the right lower extremity was placed in boot traction on a fracture table.  At that point we made sure we could visualize the hip on both the AP

## 2024-06-26 NOTE — ANESTHESIA PROCEDURE NOTES
Airway  Date/Time: 6/26/2024 11:38 AM  Urgency: elective    Airway not difficult    General Information and Staff    Patient location during procedure: OR  Resident/CRNA: Jose Alejandro Bruce APRN - CRNA  Performed: resident/CRNA/CAA   Performed by: Jose Alejandro Bruce APRN - CRNA  Authorized by: Addie Scott MD      Indications and Patient Condition  Indications for airway management: anesthesia  Spontaneous Ventilation: absent  Sedation level: deep  Preoxygenated: yes  Patient position: sniffing  MILS maintained throughout  Mask difficulty assessment: not attempted    Final Airway Details  Final airway type: supraglottic airway      Successful airway: oropharyngeal  Size 4     Number of attempts at approach: 1  Ventilation between attempts: bag mask  Number of other approaches attempted: 0    no

## 2024-06-26 NOTE — PERIOP NOTE
TRANSFER - OUT REPORT:    Verbal report given to Edwige UP on Jah Verdugo  being transferred to [facility] for routine progression of patient care       Report consisted of patient’s Situation, Background, Assessment and   Recommendations(SBAR).     Information from the following report(s) Nurse Handoff Report, Adult Overview, Surgery Report, Intake/Output, MAR, Recent Results, Med Rec Status, Cardiac Rhythm Sinus with vent bigeminy, Procedure Verification, Quality Measures, and Neuro Assessment was reviewed with the receiving nurse.    Lines:   Peripheral IV 06/25/24 Distal;Right Forearm (Active)   Site Assessment Clean, dry & intact 06/26/24 1215   Line Status Infusing 06/26/24 1215   Line Care Connections checked and tightened 06/26/24 1215   Phlebitis Assessment No symptoms 06/26/24 1215   Infiltration Assessment 0 06/25/24 1925   Alcohol Cap Used Yes 06/26/24 1215   Dressing Status Clean, dry & intact 06/26/24 1215   Dressing Type Transparent 06/26/24 1215        Opportunity for questions and clarification was provided.      Patient transported with:   O2 @ 2 liters  Tech    VTE prophylaxis orders have been written for Jah Verdugo.

## 2024-06-26 NOTE — INTERVAL H&P NOTE
Update History & Physical    The Patient's History and Physical of 6/25/2024 was reviewed with the patient and I examined the patient.  There was no change.  The surgical site was confirmed by the patients family and me.  I did speak with the patient's daughter at length regarding different treatment options I tried to outline exactly what hemiarthroplasty would involve as well as what operative fixation would involve.  After speaking with her about this I think she is much more comfortable with just trying to treat this operatively with plate and screw fixation and hopefully can get this to heal without putting him through a bigger surgery with the arthroplasty.    Plan:  The risk, benefits, expected outcome, and alternative to the recommended procedure have been discussed with the patient.  Patient understands and wants to proceed with open treatment of right femoral neck fracture with plate and screw fixation.    Electronically signed by Lincoln Melendrez MD on 6/26/2024 at 8:51 AM

## 2024-06-27 ENCOUNTER — APPOINTMENT (OUTPATIENT)
Dept: GENERAL RADIOLOGY | Age: 84
DRG: 481 | End: 2024-06-27
Payer: MEDICARE

## 2024-06-27 LAB
ANION GAP SERPL CALC-SCNC: 9 MMOL/L (ref 9–18)
BUN SERPL-MCNC: 21 MG/DL (ref 8–23)
CALCIUM SERPL-MCNC: 8.7 MG/DL (ref 8.8–10.2)
CHLORIDE SERPL-SCNC: 108 MMOL/L (ref 98–107)
CO2 SERPL-SCNC: 22 MMOL/L (ref 20–28)
CREAT SERPL-MCNC: 1.08 MG/DL (ref 0.8–1.3)
ERYTHROCYTE [DISTWIDTH] IN BLOOD BY AUTOMATED COUNT: 15 % (ref 11.9–14.6)
GLUCOSE BLD STRIP.AUTO-MCNC: 105 MG/DL (ref 65–100)
GLUCOSE BLD STRIP.AUTO-MCNC: 112 MG/DL (ref 65–100)
GLUCOSE BLD STRIP.AUTO-MCNC: 112 MG/DL (ref 65–100)
GLUCOSE BLD STRIP.AUTO-MCNC: 115 MG/DL (ref 65–100)
GLUCOSE SERPL-MCNC: 116 MG/DL (ref 70–99)
HCT VFR BLD AUTO: 35.5 % (ref 41.1–50.3)
HGB BLD-MCNC: 11.2 G/DL (ref 13.6–17.2)
MCH RBC QN AUTO: 26.7 PG (ref 26.1–32.9)
MCHC RBC AUTO-ENTMCNC: 31.5 G/DL (ref 31.4–35)
MCV RBC AUTO: 84.5 FL (ref 82–102)
NRBC # BLD: 0 K/UL (ref 0–0.2)
PLATELET # BLD AUTO: 681 K/UL (ref 150–450)
PMV BLD AUTO: 9.4 FL (ref 9.4–12.3)
POTASSIUM SERPL-SCNC: 4.9 MMOL/L (ref 3.5–5.1)
RBC # BLD AUTO: 4.2 M/UL (ref 4.23–5.6)
SERVICE CMNT-IMP: ABNORMAL
SODIUM SERPL-SCNC: 139 MMOL/L (ref 136–145)
WBC # BLD AUTO: 15 K/UL (ref 4.3–11.1)

## 2024-06-27 PROCEDURE — 2580000003 HC RX 258: Performed by: ORTHOPAEDIC SURGERY

## 2024-06-27 PROCEDURE — 80048 BASIC METABOLIC PNL TOTAL CA: CPT

## 2024-06-27 PROCEDURE — 97165 OT EVAL LOW COMPLEX 30 MIN: CPT

## 2024-06-27 PROCEDURE — 51701 INSERT BLADDER CATHETER: CPT

## 2024-06-27 PROCEDURE — 97530 THERAPEUTIC ACTIVITIES: CPT

## 2024-06-27 PROCEDURE — 1100000000 HC RM PRIVATE

## 2024-06-27 PROCEDURE — 74018 RADEX ABDOMEN 1 VIEW: CPT

## 2024-06-27 PROCEDURE — 97161 PT EVAL LOW COMPLEX 20 MIN: CPT

## 2024-06-27 PROCEDURE — 85027 COMPLETE CBC AUTOMATED: CPT

## 2024-06-27 PROCEDURE — 6370000000 HC RX 637 (ALT 250 FOR IP): Performed by: PHYSICIAN ASSISTANT

## 2024-06-27 PROCEDURE — 82962 GLUCOSE BLOOD TEST: CPT

## 2024-06-27 PROCEDURE — 36415 COLL VENOUS BLD VENIPUNCTURE: CPT

## 2024-06-27 PROCEDURE — 6370000000 HC RX 637 (ALT 250 FOR IP): Performed by: ORTHOPAEDIC SURGERY

## 2024-06-27 PROCEDURE — 51798 US URINE CAPACITY MEASURE: CPT

## 2024-06-27 PROCEDURE — 6360000002 HC RX W HCPCS: Performed by: ORTHOPAEDIC SURGERY

## 2024-06-27 PROCEDURE — 97535 SELF CARE MNGMENT TRAINING: CPT

## 2024-06-27 RX ORDER — TRAMADOL HYDROCHLORIDE 50 MG/1
50 TABLET ORAL EVERY 6 HOURS PRN
Status: DISCONTINUED | OUTPATIENT
Start: 2024-06-27 | End: 2024-07-02 | Stop reason: HOSPADM

## 2024-06-27 RX ORDER — VITAMIN B COMPLEX
2000 TABLET ORAL DAILY
Status: DISCONTINUED | OUTPATIENT
Start: 2024-06-27 | End: 2024-07-02 | Stop reason: HOSPADM

## 2024-06-27 RX ADMIN — SODIUM CHLORIDE, PRESERVATIVE FREE 5 ML: 5 INJECTION INTRAVENOUS at 21:00

## 2024-06-27 RX ADMIN — ASPIRIN 325 MG: 325 TABLET, COATED ORAL at 09:02

## 2024-06-27 RX ADMIN — SODIUM CHLORIDE, PRESERVATIVE FREE 5 ML: 5 INJECTION INTRAVENOUS at 20:58

## 2024-06-27 RX ADMIN — TRAMADOL HYDROCHLORIDE 50 MG: 50 TABLET ORAL at 12:24

## 2024-06-27 RX ADMIN — SODIUM CHLORIDE, PRESERVATIVE FREE 10 ML: 5 INJECTION INTRAVENOUS at 09:03

## 2024-06-27 RX ADMIN — QUETIAPINE FUMARATE 25 MG: 25 TABLET ORAL at 20:58

## 2024-06-27 RX ADMIN — WATER 1000 MG: 1 INJECTION INTRAMUSCULAR; INTRAVENOUS; SUBCUTANEOUS at 08:57

## 2024-06-27 RX ADMIN — ASPIRIN 325 MG: 325 TABLET, COATED ORAL at 20:58

## 2024-06-27 RX ADMIN — TRAMADOL HYDROCHLORIDE 50 MG: 50 TABLET ORAL at 20:58

## 2024-06-27 RX ADMIN — VITAMIN D, TAB 1000IU (100/BT) 2000 UNITS: 25 TAB at 12:24

## 2024-06-27 ASSESSMENT — PAIN SCALES - GENERAL
PAINLEVEL_OUTOF10: 4
PAINLEVEL_OUTOF10: 4
PAINLEVEL_OUTOF10: 0

## 2024-06-27 ASSESSMENT — PAIN DESCRIPTION - DESCRIPTORS
DESCRIPTORS: ACHING
DESCRIPTORS: ACHING

## 2024-06-27 ASSESSMENT — PAIN DESCRIPTION - PAIN TYPE
TYPE: SURGICAL PAIN
TYPE: SURGICAL PAIN

## 2024-06-27 ASSESSMENT — PAIN DESCRIPTION - LOCATION
LOCATION: HIP
LOCATION: HIP

## 2024-06-27 ASSESSMENT — PAIN DESCRIPTION - ONSET: ONSET: GRADUAL

## 2024-06-27 ASSESSMENT — PAIN DESCRIPTION - FREQUENCY: FREQUENCY: CONTINUOUS

## 2024-06-27 ASSESSMENT — PAIN - FUNCTIONAL ASSESSMENT
PAIN_FUNCTIONAL_ASSESSMENT: PREVENTS OR INTERFERES SOME ACTIVE ACTIVITIES AND ADLS
PAIN_FUNCTIONAL_ASSESSMENT: PREVENTS OR INTERFERES WITH MANY ACTIVE NOT PASSIVE ACTIVITIES

## 2024-06-27 ASSESSMENT — PAIN DESCRIPTION - ORIENTATION
ORIENTATION: RIGHT
ORIENTATION: RIGHT

## 2024-06-27 NOTE — DISCHARGE INSTRUCTIONS
[facility]      IF YOU HAVE ANY PROBLEMS ONCE YOU ARE AT HOME CALL THE FOLLOWING NUMBERS:   Main office number: [phone] ask for Harriett (medical assistant with Dr. Melendrez)  Office Address: [address] [place] [address]  [address] [place] [id number]      Patient Discharge Instructions    Jah Verdugo / [id number] : 1940    Admitted 2024           To be given to Skilled Nursing Home on Admission         Weight bearing status: As tolerated with walker and assistance    Activity  Continue Physical Therapy and Occupational Therapy   Fall precautions     Wound Care   Staples are to be left in place and removed in our office  and Dry dressing changes using sterile technique every other day or more frequently if needed     Diet  Resume regular or diabetic diet      Medications    Patient medications are listed on the medication reconciliation sheet.     Follow up   Follow up in our office in 2 weeks postop   All patients are to be transported via stretcher unless they are able to independently get out of a chair and stand without assistance.               Information obtained by :  I understand that if any problems occur once I am at home I am to contact my physician.    I understand and acknowledge receipt of the instructions indicated above.                                                                                                                                           Physician's or R.N.'s Signature                                                                  Date/Time                                                                                                                                              Patient or Representative Signature                                                          Date/Time

## 2024-06-27 NOTE — THERAPY EVALUATION
ACUTE OCCUPATIONAL THERAPY GOALS:   (Developed with and agreed upon by patient and/or caregiver.)  1. Patient will complete upper body bathing and dressing with STAND BY ASSIST and adaptive equipment as needed.  2. Patient will complete lower body bathing and dressing with MINIMAL ASSIST and adaptive equipment as needed.     3. Patient will complete toileting with MINIMAL ASSIST.  4. Patient will complete grooming ADL standing at sink with CONTACT GUARD ASSIST.  5. Patient will complete functional transfers with MINIMAL ASSIST and adaptive equipment as needed.   6. Patient will follow 90% of one-step commands during functional task.    Timeframe: 7 visits      OCCUPATIONAL THERAPY Initial Assessment and AM       OT Visit Days: 1  Acknowledge Orders  Time  OT Charge Capture  Rehab Caseload Tracker      Jah Verdugo is a 84 y.o. male   PRIMARY DIAGNOSIS: Closed fracture of neck of right femur, initial encounter ([facility])  Closed fracture of neck of right femur, initial encounter ([facility]) [S72.001A]  Closed head injury, initial encounter [S09.90XA]  Procedure(s) (LRB):  HIP OPEN REDUCTION INTERNAL FIXATION FNS (Right)  1 Day Post-Op  Reason for Referral: Pain in Right Hip (M25.551)  Generalized Muscle Weakness (M62.81)  Other lack of cordination (R27.8)  Difficulty in walking, Not elsewhere classified (R26.2)  History of falling (Z91.81)  Dizziness and Giddiness (R42)  Inpatient: Payor: [facility] MEDICARE / Plan: [facility] MEDICARE ADVANTAGE / Product Type: *No Product type* /     ASSESSMENT:     REHAB RECOMMENDATIONS:   Recommendation to date pending progress:  Setting:  STR at his facility, if available    Equipment:    To Be Determined     ASSESSMENT:  Mr. Verdugo is a 85 y/o male presents POD 1 R hip ORIF, is WBAT. Evaluation and gathering baseline information were limited by pt's cognitive status, as pt unable to answer questions or follow commands. Pt became agitated when therapist asked him to roll in bed. Unclear what his

## 2024-06-28 LAB
GLUCOSE BLD STRIP.AUTO-MCNC: 104 MG/DL (ref 65–100)
GLUCOSE BLD STRIP.AUTO-MCNC: 79 MG/DL (ref 65–100)
GLUCOSE BLD STRIP.AUTO-MCNC: 87 MG/DL (ref 65–100)
GLUCOSE BLD STRIP.AUTO-MCNC: 88 MG/DL (ref 65–100)
GLUCOSE BLD STRIP.AUTO-MCNC: 96 MG/DL (ref 65–100)
SERVICE CMNT-IMP: ABNORMAL
SERVICE CMNT-IMP: NORMAL

## 2024-06-28 PROCEDURE — 51702 INSERT TEMP BLADDER CATH: CPT

## 2024-06-28 PROCEDURE — 1100000000 HC RM PRIVATE

## 2024-06-28 PROCEDURE — 97112 NEUROMUSCULAR REEDUCATION: CPT

## 2024-06-28 PROCEDURE — 82962 GLUCOSE BLOOD TEST: CPT

## 2024-06-28 PROCEDURE — 6370000000 HC RX 637 (ALT 250 FOR IP): Performed by: PHYSICIAN ASSISTANT

## 2024-06-28 PROCEDURE — 97535 SELF CARE MNGMENT TRAINING: CPT

## 2024-06-28 PROCEDURE — 97530 THERAPEUTIC ACTIVITIES: CPT

## 2024-06-28 PROCEDURE — 2580000003 HC RX 258: Performed by: ORTHOPAEDIC SURGERY

## 2024-06-28 PROCEDURE — 92610 EVALUATE SWALLOWING FUNCTION: CPT

## 2024-06-28 PROCEDURE — 6360000002 HC RX W HCPCS: Performed by: ORTHOPAEDIC SURGERY

## 2024-06-28 PROCEDURE — 6370000000 HC RX 637 (ALT 250 FOR IP): Performed by: ORTHOPAEDIC SURGERY

## 2024-06-28 PROCEDURE — 6370000000 HC RX 637 (ALT 250 FOR IP): Performed by: STUDENT IN AN ORGANIZED HEALTH CARE EDUCATION/TRAINING PROGRAM

## 2024-06-28 PROCEDURE — 51798 US URINE CAPACITY MEASURE: CPT

## 2024-06-28 RX ORDER — POLYETHYLENE GLYCOL 3350 17 G/17G
17 POWDER, FOR SOLUTION ORAL DAILY
Status: DISCONTINUED | OUTPATIENT
Start: 2024-06-28 | End: 2024-07-01

## 2024-06-28 RX ADMIN — QUETIAPINE FUMARATE 25 MG: 25 TABLET ORAL at 20:48

## 2024-06-28 RX ADMIN — ASPIRIN 325 MG: 325 TABLET, COATED ORAL at 20:48

## 2024-06-28 RX ADMIN — POLYETHYLENE GLYCOL 3350 17 G: 17 POWDER, FOR SOLUTION ORAL at 15:37

## 2024-06-28 RX ADMIN — ASPIRIN 325 MG: 325 TABLET, COATED ORAL at 08:18

## 2024-06-28 RX ADMIN — VITAMIN D, TAB 1000IU (100/BT) 2000 UNITS: 25 TAB at 08:18

## 2024-06-28 RX ADMIN — SODIUM CHLORIDE, PRESERVATIVE FREE 10 ML: 5 INJECTION INTRAVENOUS at 20:50

## 2024-06-28 RX ADMIN — SODIUM CHLORIDE, PRESERVATIVE FREE 5 ML: 5 INJECTION INTRAVENOUS at 08:19

## 2024-06-28 RX ADMIN — QUETIAPINE FUMARATE 25 MG: 25 TABLET ORAL at 12:06

## 2024-06-28 RX ADMIN — WATER 1000 MG: 1 INJECTION INTRAMUSCULAR; INTRAVENOUS; SUBCUTANEOUS at 08:18

## 2024-06-28 ASSESSMENT — PAIN SCALES - GENERAL
PAINLEVEL_OUTOF10: 0
PAINLEVEL_OUTOF10: 0

## 2024-06-29 LAB
ALBUMIN SERPL-MCNC: 2.6 G/DL (ref 3.2–4.6)
ANION GAP SERPL CALC-SCNC: 10 MMOL/L (ref 9–18)
BASOPHILS # BLD: 0.1 K/UL (ref 0–0.2)
BASOPHILS NFR BLD: 1 % (ref 0–2)
BUN SERPL-MCNC: 22 MG/DL (ref 8–23)
CALCIUM SERPL-MCNC: 8.7 MG/DL (ref 8.8–10.2)
CHLORIDE SERPL-SCNC: 107 MMOL/L (ref 98–107)
CO2 SERPL-SCNC: 24 MMOL/L (ref 20–28)
CREAT SERPL-MCNC: 0.97 MG/DL (ref 0.8–1.3)
DIFFERENTIAL METHOD BLD: ABNORMAL
EOSINOPHIL # BLD: 1.2 K/UL (ref 0–0.8)
EOSINOPHIL NFR BLD: 9 % (ref 0.5–7.8)
ERYTHROCYTE [DISTWIDTH] IN BLOOD BY AUTOMATED COUNT: 15.1 % (ref 11.9–14.6)
FERRITIN SERPL-MCNC: 132 NG/ML (ref 8–388)
GLUCOSE BLD STRIP.AUTO-MCNC: 101 MG/DL (ref 65–100)
GLUCOSE BLD STRIP.AUTO-MCNC: 103 MG/DL (ref 65–100)
GLUCOSE BLD STRIP.AUTO-MCNC: 111 MG/DL (ref 65–100)
GLUCOSE BLD STRIP.AUTO-MCNC: 95 MG/DL (ref 65–100)
GLUCOSE SERPL-MCNC: 85 MG/DL (ref 70–99)
HCT VFR BLD AUTO: 39.6 % (ref 41.1–50.3)
HGB BLD-MCNC: 12.3 G/DL (ref 13.6–17.2)
IMM GRANULOCYTES # BLD AUTO: 0.1 K/UL (ref 0–0.5)
IMM GRANULOCYTES NFR BLD AUTO: 1 % (ref 0–5)
IRON SATN MFR SERPL: 9 % (ref 20–50)
IRON SERPL-MCNC: 21 UG/DL (ref 35–100)
LYMPHOCYTES # BLD: 2.5 K/UL (ref 0.5–4.6)
LYMPHOCYTES NFR BLD: 20 % (ref 13–44)
MCH RBC QN AUTO: 26.5 PG (ref 26.1–32.9)
MCHC RBC AUTO-ENTMCNC: 31.1 G/DL (ref 31.4–35)
MCV RBC AUTO: 85.2 FL (ref 82–102)
MONOCYTES # BLD: 1 K/UL (ref 0.1–1.3)
MONOCYTES NFR BLD: 8 % (ref 4–12)
NEUTS SEG # BLD: 7.7 K/UL (ref 1.7–8.2)
NEUTS SEG NFR BLD: 62 % (ref 43–78)
NRBC # BLD: 0 K/UL (ref 0–0.2)
PHOSPHATE SERPL-MCNC: 3.1 MG/DL (ref 2.5–4.5)
PLATELET # BLD AUTO: 783 K/UL (ref 150–450)
PMV BLD AUTO: 9.6 FL (ref 9.4–12.3)
POTASSIUM SERPL-SCNC: 4.3 MMOL/L (ref 3.5–5.1)
RBC # BLD AUTO: 4.65 M/UL (ref 4.23–5.6)
SERVICE CMNT-IMP: ABNORMAL
SERVICE CMNT-IMP: NORMAL
SODIUM SERPL-SCNC: 141 MMOL/L (ref 136–145)
TIBC SERPL-MCNC: 232 UG/DL (ref 240–450)
UIBC SERPL-MCNC: 211 UG/DL (ref 112–347)
WBC # BLD AUTO: 12.4 K/UL (ref 4.3–11.1)

## 2024-06-29 PROCEDURE — 6370000000 HC RX 637 (ALT 250 FOR IP): Performed by: PHYSICIAN ASSISTANT

## 2024-06-29 PROCEDURE — 85025 COMPLETE CBC W/AUTO DIFF WBC: CPT

## 2024-06-29 PROCEDURE — 6360000002 HC RX W HCPCS: Performed by: ORTHOPAEDIC SURGERY

## 2024-06-29 PROCEDURE — 83540 ASSAY OF IRON: CPT

## 2024-06-29 PROCEDURE — 6370000000 HC RX 637 (ALT 250 FOR IP): Performed by: ORTHOPAEDIC SURGERY

## 2024-06-29 PROCEDURE — 36415 COLL VENOUS BLD VENIPUNCTURE: CPT

## 2024-06-29 PROCEDURE — 94640 AIRWAY INHALATION TREATMENT: CPT

## 2024-06-29 PROCEDURE — 51702 INSERT TEMP BLADDER CATH: CPT

## 2024-06-29 PROCEDURE — 1100000000 HC RM PRIVATE

## 2024-06-29 PROCEDURE — 6370000000 HC RX 637 (ALT 250 FOR IP): Performed by: STUDENT IN AN ORGANIZED HEALTH CARE EDUCATION/TRAINING PROGRAM

## 2024-06-29 PROCEDURE — 80069 RENAL FUNCTION PANEL: CPT

## 2024-06-29 PROCEDURE — 2580000003 HC RX 258: Performed by: ORTHOPAEDIC SURGERY

## 2024-06-29 PROCEDURE — 83550 IRON BINDING TEST: CPT

## 2024-06-29 PROCEDURE — 82962 GLUCOSE BLOOD TEST: CPT

## 2024-06-29 PROCEDURE — 82728 ASSAY OF FERRITIN: CPT

## 2024-06-29 PROCEDURE — 97530 THERAPEUTIC ACTIVITIES: CPT

## 2024-06-29 PROCEDURE — 94760 N-INVAS EAR/PLS OXIMETRY 1: CPT

## 2024-06-29 RX ADMIN — SODIUM CHLORIDE, PRESERVATIVE FREE 10 ML: 5 INJECTION INTRAVENOUS at 20:40

## 2024-06-29 RX ADMIN — SODIUM CHLORIDE, PRESERVATIVE FREE 10 ML: 5 INJECTION INTRAVENOUS at 08:49

## 2024-06-29 RX ADMIN — TRAMADOL HYDROCHLORIDE 50 MG: 50 TABLET ORAL at 09:07

## 2024-06-29 RX ADMIN — VITAMIN D, TAB 1000IU (100/BT) 2000 UNITS: 25 TAB at 08:48

## 2024-06-29 RX ADMIN — ASPIRIN 325 MG: 325 TABLET, COATED ORAL at 08:48

## 2024-06-29 RX ADMIN — QUETIAPINE FUMARATE 25 MG: 25 TABLET ORAL at 20:39

## 2024-06-29 RX ADMIN — POLYETHYLENE GLYCOL 3350 17 G: 17 POWDER, FOR SOLUTION ORAL at 08:48

## 2024-06-29 RX ADMIN — WATER 1000 MG: 1 INJECTION INTRAMUSCULAR; INTRAVENOUS; SUBCUTANEOUS at 08:49

## 2024-06-29 RX ADMIN — ASPIRIN 325 MG: 325 TABLET, COATED ORAL at 20:39

## 2024-06-29 ASSESSMENT — PAIN DESCRIPTION - DESCRIPTORS: DESCRIPTORS: PATIENT UNABLE TO DESCRIBE

## 2024-06-29 ASSESSMENT — PAIN SCALES - PAIN ASSESSMENT IN ADVANCED DEMENTIA (PAINAD)
BODYLANGUAGE: TENSE, DISTRESSED PACING, FIDGETING
FACIALEXPRESSION: SMILING OR INEXPRESSIVE
TOTALSCORE: 0
TOTALSCORE: 5
BREATHING: NORMAL
CONSOLABILITY: NO NEED TO CONSOLE
NEGVOCALIZATION: REPEATED TROUBLED CALLING OUT, LOUD MOANING/GROANING, CRYING
BODYLANGUAGE: RELAXED
BREATHING: OCCASIONAL LABORED BREATHING, SHORT PERIOD OF HYPERVENTILATION
FACIALEXPRESSION: SAD, FRIGHTENED, FROWN
CONSOLABILITY: NO NEED TO CONSOLE

## 2024-06-29 ASSESSMENT — PAIN SCALES - GENERAL
PAINLEVEL_OUTOF10: 5
PAINLEVEL_OUTOF10: 0
PAINLEVEL_OUTOF10: 0

## 2024-06-29 ASSESSMENT — PAIN DESCRIPTION - PAIN TYPE: TYPE: SURGICAL PAIN

## 2024-06-29 ASSESSMENT — PAIN DESCRIPTION - LOCATION: LOCATION: HIP

## 2024-06-29 ASSESSMENT — PAIN DESCRIPTION - ORIENTATION: ORIENTATION: RIGHT

## 2024-06-30 LAB
GLUCOSE BLD STRIP.AUTO-MCNC: 81 MG/DL (ref 65–100)
SERVICE CMNT-IMP: NORMAL

## 2024-06-30 PROCEDURE — 2580000003 HC RX 258: Performed by: ORTHOPAEDIC SURGERY

## 2024-06-30 PROCEDURE — 51798 US URINE CAPACITY MEASURE: CPT

## 2024-06-30 PROCEDURE — 6370000000 HC RX 637 (ALT 250 FOR IP): Performed by: PHYSICIAN ASSISTANT

## 2024-06-30 PROCEDURE — 6370000000 HC RX 637 (ALT 250 FOR IP): Performed by: STUDENT IN AN ORGANIZED HEALTH CARE EDUCATION/TRAINING PROGRAM

## 2024-06-30 PROCEDURE — 97112 NEUROMUSCULAR REEDUCATION: CPT

## 2024-06-30 PROCEDURE — 1100000000 HC RM PRIVATE

## 2024-06-30 PROCEDURE — 97535 SELF CARE MNGMENT TRAINING: CPT

## 2024-06-30 PROCEDURE — 97530 THERAPEUTIC ACTIVITIES: CPT

## 2024-06-30 PROCEDURE — 51702 INSERT TEMP BLADDER CATH: CPT

## 2024-06-30 PROCEDURE — 82962 GLUCOSE BLOOD TEST: CPT

## 2024-06-30 PROCEDURE — 6360000002 HC RX W HCPCS: Performed by: ORTHOPAEDIC SURGERY

## 2024-06-30 PROCEDURE — 6370000000 HC RX 637 (ALT 250 FOR IP): Performed by: ORTHOPAEDIC SURGERY

## 2024-06-30 RX ADMIN — SODIUM CHLORIDE, PRESERVATIVE FREE 10 ML: 5 INJECTION INTRAVENOUS at 20:38

## 2024-06-30 RX ADMIN — VITAMIN D, TAB 1000IU (100/BT) 2000 UNITS: 25 TAB at 09:22

## 2024-06-30 RX ADMIN — WATER 1000 MG: 1 INJECTION INTRAMUSCULAR; INTRAVENOUS; SUBCUTANEOUS at 09:22

## 2024-06-30 RX ADMIN — SODIUM CHLORIDE, PRESERVATIVE FREE 10 ML: 5 INJECTION INTRAVENOUS at 09:22

## 2024-06-30 RX ADMIN — POLYETHYLENE GLYCOL 3350 17 G: 17 POWDER, FOR SOLUTION ORAL at 09:22

## 2024-06-30 RX ADMIN — QUETIAPINE FUMARATE 25 MG: 25 TABLET ORAL at 18:00

## 2024-06-30 RX ADMIN — ASPIRIN 325 MG: 325 TABLET, COATED ORAL at 09:22

## 2024-06-30 RX ADMIN — SODIUM CHLORIDE, PRESERVATIVE FREE 10 ML: 5 INJECTION INTRAVENOUS at 09:23

## 2024-06-30 RX ADMIN — ASPIRIN 325 MG: 325 TABLET, COATED ORAL at 20:37

## 2024-06-30 ASSESSMENT — PAIN SCALES - PAIN ASSESSMENT IN ADVANCED DEMENTIA (PAINAD)
BODYLANGUAGE: RELAXED
FACIALEXPRESSION: SMILING OR INEXPRESSIVE
BREATHING: NORMAL
CONSOLABILITY: NO NEED TO CONSOLE
TOTALSCORE: 0

## 2024-06-30 ASSESSMENT — PAIN SCALES - GENERAL: PAINLEVEL_OUTOF10: 0

## 2024-07-01 PROBLEM — R33.9 URINARY RETENTION: Status: ACTIVE | Noted: 2024-07-01

## 2024-07-01 PROBLEM — N39.0 UTI (URINARY TRACT INFECTION): Status: ACTIVE | Noted: 2024-07-01

## 2024-07-01 PROBLEM — D75.839 THROMBOCYTOSIS: Status: ACTIVE | Noted: 2024-07-01

## 2024-07-01 PROBLEM — R13.10 DYSPHAGIA: Status: ACTIVE | Noted: 2024-07-01

## 2024-07-01 LAB
SARS-COV-2 RDRP RESP QL NAA+PROBE: NOT DETECTED
SOURCE: NORMAL

## 2024-07-01 PROCEDURE — 6370000000 HC RX 637 (ALT 250 FOR IP): Performed by: STUDENT IN AN ORGANIZED HEALTH CARE EDUCATION/TRAINING PROGRAM

## 2024-07-01 PROCEDURE — 6370000000 HC RX 637 (ALT 250 FOR IP): Performed by: ORTHOPAEDIC SURGERY

## 2024-07-01 PROCEDURE — 87635 SARS-COV-2 COVID-19 AMP PRB: CPT

## 2024-07-01 PROCEDURE — 2580000003 HC RX 258: Performed by: ORTHOPAEDIC SURGERY

## 2024-07-01 PROCEDURE — 1100000000 HC RM PRIVATE

## 2024-07-01 PROCEDURE — 6370000000 HC RX 637 (ALT 250 FOR IP): Performed by: PHYSICIAN ASSISTANT

## 2024-07-01 PROCEDURE — 97530 THERAPEUTIC ACTIVITIES: CPT

## 2024-07-01 PROCEDURE — 97535 SELF CARE MNGMENT TRAINING: CPT

## 2024-07-01 RX ORDER — FERROUS SULFATE 325(65) MG
325 TABLET ORAL
Status: DISCONTINUED | OUTPATIENT
Start: 2024-07-02 | End: 2024-07-02 | Stop reason: HOSPADM

## 2024-07-01 RX ORDER — SENNA AND DOCUSATE SODIUM 50; 8.6 MG/1; MG/1
2 TABLET, FILM COATED ORAL DAILY
Status: DISCONTINUED | OUTPATIENT
Start: 2024-07-01 | End: 2024-07-02 | Stop reason: HOSPADM

## 2024-07-01 RX ORDER — POLYETHYLENE GLYCOL 3350 17 G/17G
17 POWDER, FOR SOLUTION ORAL 2 TIMES DAILY
Status: DISCONTINUED | OUTPATIENT
Start: 2024-07-01 | End: 2024-07-02 | Stop reason: HOSPADM

## 2024-07-01 RX ADMIN — SODIUM CHLORIDE, PRESERVATIVE FREE 5 ML: 5 INJECTION INTRAVENOUS at 08:21

## 2024-07-01 RX ADMIN — ASPIRIN 325 MG: 325 TABLET, COATED ORAL at 08:20

## 2024-07-01 RX ADMIN — SODIUM CHLORIDE, PRESERVATIVE FREE 5 ML: 5 INJECTION INTRAVENOUS at 21:20

## 2024-07-01 RX ADMIN — POLYETHYLENE GLYCOL 3350 17 G: 17 POWDER, FOR SOLUTION ORAL at 21:20

## 2024-07-01 RX ADMIN — VITAMIN D, TAB 1000IU (100/BT) 2000 UNITS: 25 TAB at 08:20

## 2024-07-01 RX ADMIN — QUETIAPINE FUMARATE 25 MG: 25 TABLET ORAL at 17:28

## 2024-07-01 RX ADMIN — DOCUSATE SODIUM 50 MG AND SENNOSIDES 8.6 MG 2 TABLET: 8.6; 5 TABLET, FILM COATED ORAL at 14:32

## 2024-07-01 RX ADMIN — ASPIRIN 325 MG: 325 TABLET, COATED ORAL at 21:20

## 2024-07-02 VITALS
OXYGEN SATURATION: 95 % | BODY MASS INDEX: 22.11 KG/M2 | SYSTOLIC BLOOD PRESSURE: 103 MMHG | WEIGHT: 132.7 LBS | RESPIRATION RATE: 20 BRPM | HEIGHT: 65 IN | TEMPERATURE: 98.6 F | HEART RATE: 90 BPM | DIASTOLIC BLOOD PRESSURE: 66 MMHG

## 2024-07-02 PROCEDURE — 6370000000 HC RX 637 (ALT 250 FOR IP): Performed by: PHYSICIAN ASSISTANT

## 2024-07-02 PROCEDURE — 6370000000 HC RX 637 (ALT 250 FOR IP): Performed by: ORTHOPAEDIC SURGERY

## 2024-07-02 PROCEDURE — 2580000003 HC RX 258: Performed by: ORTHOPAEDIC SURGERY

## 2024-07-02 PROCEDURE — 97112 NEUROMUSCULAR REEDUCATION: CPT

## 2024-07-02 PROCEDURE — 6370000000 HC RX 637 (ALT 250 FOR IP): Performed by: STUDENT IN AN ORGANIZED HEALTH CARE EDUCATION/TRAINING PROGRAM

## 2024-07-02 PROCEDURE — 97530 THERAPEUTIC ACTIVITIES: CPT

## 2024-07-02 RX ORDER — ASPIRIN 325 MG
325 TABLET, DELAYED RELEASE (ENTERIC COATED) ORAL 2 TIMES DAILY
Qty: 30 TABLET | Refills: 3
Start: 2024-07-02

## 2024-07-02 RX ORDER — TRAMADOL HYDROCHLORIDE 50 MG/1
50 TABLET ORAL EVERY 6 HOURS PRN
Qty: 9 TABLET | Refills: 0 | Status: SHIPPED | OUTPATIENT
Start: 2024-07-02 | End: 2024-07-05

## 2024-07-02 RX ORDER — QUETIAPINE FUMARATE 25 MG/1
25 TABLET, FILM COATED ORAL 2 TIMES DAILY PRN
Qty: 60 TABLET | Refills: 0 | Status: SHIPPED | OUTPATIENT
Start: 2024-07-02 | End: 2024-08-01

## 2024-07-02 RX ORDER — ENEMA 19; 7 G/133ML; G/133ML
1 ENEMA RECTAL AS NEEDED
Status: DISCONTINUED | OUTPATIENT
Start: 2024-07-02 | End: 2024-07-02 | Stop reason: HOSPADM

## 2024-07-02 RX ORDER — CHOLECALCIFEROL (VITAMIN D3) 50 MCG
2000 TABLET ORAL DAILY
Qty: 60 TABLET | Refills: 0
Start: 2024-07-03 | End: 2024-09-01

## 2024-07-02 RX ORDER — FERROUS SULFATE 325(65) MG
325 TABLET ORAL
Qty: 30 TABLET | Refills: 3
Start: 2024-07-03

## 2024-07-02 RX ADMIN — SODIUM CHLORIDE, PRESERVATIVE FREE 5 ML: 5 INJECTION INTRAVENOUS at 08:54

## 2024-07-02 RX ADMIN — POLYETHYLENE GLYCOL 3350 17 G: 17 POWDER, FOR SOLUTION ORAL at 08:52

## 2024-07-02 RX ADMIN — FERROUS SULFATE TAB 325 MG (65 MG ELEMENTAL FE) 325 MG: 325 (65 FE) TAB at 08:53

## 2024-07-02 RX ADMIN — SODIUM CHLORIDE, PRESERVATIVE FREE 5 ML: 5 INJECTION INTRAVENOUS at 08:52

## 2024-07-02 RX ADMIN — ASPIRIN 325 MG: 325 TABLET, COATED ORAL at 08:54

## 2024-07-02 RX ADMIN — TRAMADOL HYDROCHLORIDE 50 MG: 50 TABLET ORAL at 00:02

## 2024-07-02 RX ADMIN — VITAMIN D, TAB 1000IU (100/BT) 2000 UNITS: 25 TAB at 08:53

## 2024-07-02 RX ADMIN — DOCUSATE SODIUM 50 MG AND SENNOSIDES 8.6 MG 2 TABLET: 8.6; 5 TABLET, FILM COATED ORAL at 08:53

## 2024-07-02 RX ADMIN — QUETIAPINE FUMARATE 25 MG: 25 TABLET ORAL at 09:28

## 2024-07-02 ASSESSMENT — PAIN DESCRIPTION - PAIN TYPE: TYPE: SURGICAL PAIN

## 2024-07-02 ASSESSMENT — PAIN DESCRIPTION - LOCATION: LOCATION: HIP

## 2024-07-02 ASSESSMENT — PAIN DESCRIPTION - ONSET: ONSET: GRADUAL

## 2024-07-02 ASSESSMENT — PAIN DESCRIPTION - FREQUENCY: FREQUENCY: INTERMITTENT

## 2024-07-02 ASSESSMENT — PAIN DESCRIPTION - DESCRIPTORS: DESCRIPTORS: ACHING

## 2024-07-02 ASSESSMENT — PAIN SCALES - GENERAL
PAINLEVEL_OUTOF10: 0
PAINLEVEL_OUTOF10: 5

## 2024-07-02 ASSESSMENT — PAIN DESCRIPTION - ORIENTATION: ORIENTATION: RIGHT

## 2024-07-02 ASSESSMENT — PAIN - FUNCTIONAL ASSESSMENT: PAIN_FUNCTIONAL_ASSESSMENT: PREVENTS OR INTERFERES SOME ACTIVE ACTIVITIES AND ADLS

## 2024-07-02 NOTE — PLAN OF CARE
Problem: Discharge Planning  Goal: Discharge to home or other facility with appropriate resources  6/26/2024 2232 by Paulina Moore RN  Outcome: Progressing  Flowsheets (Taken 6/26/2024 1940)  Discharge to home or other facility with appropriate resources: Identify barriers to discharge with patient and caregiver  6/26/2024 1202 by Tiana Joseph RN  Outcome: Progressing     Problem: Safety - Adult  Goal: Free from fall injury  6/26/2024 2232 by Paulina Moore RN  Outcome: Progressing  Flowsheets (Taken 6/26/2024 1940)  Free From Fall Injury: Instruct family/caregiver on patient safety  6/26/2024 1202 by Tiana Joseph RN  Outcome: Progressing     Problem: ABCDS Injury Assessment  Goal: Absence of physical injury  6/26/2024 2232 by Paulina Moore RN  Outcome: Progressing  6/26/2024 1202 by Tiana Joseph RN  Outcome: Progressing     Problem: Skin/Tissue Integrity  Goal: Absence of new skin breakdown  Description: 1.  Monitor for areas of redness and/or skin breakdown  2.  Assess vascular access sites hourly  3.  Every 4-6 hours minimum:  Change oxygen saturation probe site  4.  Every 4-6 hours:  If on nasal continuous positive airway pressure, respiratory therapy assess nares and determine need for appliance change or resting period.  6/26/2024 2232 by Paulina Moore RN  Outcome: Progressing  6/26/2024 1202 by Tiana Joseph RN  Outcome: Progressing     Problem: Pain  Goal: Verbalizes/displays adequate comfort level or baseline comfort level  6/26/2024 2232 by Paulina Moore RN  Outcome: Progressing  6/26/2024 1202 by Tiana Joseph RN  Outcome: Progressing     Problem: Confusion  Goal: Confusion, delirium, dementia, or psychosis is improved or at baseline  Description: INTERVENTIONS:  1. Assess for possible contributors to thought disturbance, including medications, impaired vision or hearing, underlying metabolic abnormalities, dehydration, psychiatric diagnoses, and notify attending 
  Problem: Discharge Planning  Goal: Discharge to home or other facility with appropriate resources  7/2/2024 1609 by Elle Head RN  Outcome: Completed  7/2/2024 1608 by Elle Head RN  Outcome: Progressing  Flowsheets (Taken 7/2/2024 0852)  Discharge to home or other facility with appropriate resources: Identify barriers to discharge with patient and caregiver     Problem: Safety - Adult  Goal: Free from fall injury  7/2/2024 1609 by Elle Head RN  Outcome: Completed  7/2/2024 1608 by Elle Head RN  Outcome: Progressing  Flowsheets (Taken 7/2/2024 0852)  Free From Fall Injury: Instruct family/caregiver on patient safety     Problem: ABCDS Injury Assessment  Goal: Absence of physical injury  7/2/2024 1609 by Elle Head RN  Outcome: Completed  7/2/2024 1608 by Elle Head RN  Outcome: Progressing  Flowsheets (Taken 7/2/2024 0852)  Absence of Physical Injury: Implement safety measures based on patient assessment     Problem: Skin/Tissue Integrity  Goal: Absence of new skin breakdown  Description: 1.  Monitor for areas of redness and/or skin breakdown  2.  Assess vascular access sites hourly  3.  Every 4-6 hours minimum:  Change oxygen saturation probe site  4.  Every 4-6 hours:  If on nasal continuous positive airway pressure, respiratory therapy assess nares and determine need for appliance change or resting period.  7/2/2024 1609 by Elle Head RN  Outcome: Completed  7/2/2024 1608 by Elle Head RN  Outcome: Progressing     Problem: Pain  Goal: Verbalizes/displays adequate comfort level or baseline comfort level  7/2/2024 1609 by Elle Head RN  Outcome: Completed  7/2/2024 1608 by Elle Head RN  Outcome: Progressing  Flowsheets  Taken 7/2/2024 0852  Verbalizes/displays adequate comfort level or baseline comfort level: Encourage patient to monitor pain and request assistance  Taken 7/2/2024 0736  Verbalizes/displays adequate comfort level or 
  Problem: Discharge Planning  Goal: Discharge to home or other facility with appropriate resources  Outcome: Progressing     Problem: Safety - Adult  Goal: Free from fall injury  Outcome: Progressing     Problem: ABCDS Injury Assessment  Goal: Absence of physical injury  Outcome: Progressing     Problem: Skin/Tissue Integrity  Goal: Absence of new skin breakdown  Description: 1.  Monitor for areas of redness and/or skin breakdown  2.  Assess vascular access sites hourly  3.  Every 4-6 hours minimum:  Change oxygen saturation probe site  4.  Every 4-6 hours:  If on nasal continuous positive airway pressure, respiratory therapy assess nares and determine need for appliance change or resting period.  Outcome: Progressing     Problem: Pain  Goal: Verbalizes/displays adequate comfort level or baseline comfort level  Outcome: Progressing     Problem: Confusion  Goal: Confusion, delirium, dementia, or psychosis is improved or at baseline  Description: INTERVENTIONS:  1. Assess for possible contributors to thought disturbance, including medications, impaired vision or hearing, underlying metabolic abnormalities, dehydration, psychiatric diagnoses, and notify attending LIP  2. [place] high risk fall precautions, as indicated  3. Provide frequent short contacts to provide reality reorientation, refocusing and direction  4. Decrease environmental stimuli, including noise as appropriate  5. Monitor and intervene to maintain adequate nutrition, hydration, elimination, sleep and activity  6. If unable to ensure safety without constant attention obtain sitter and review sitter guidelines with assigned personnel  7. Initiate Psychosocial CNS and Spiritual Care consult, as indicated  Outcome: Progressing     Problem: Neurosensory - Adult  Goal: Achieves stable or improved neurological status  Outcome: Progressing     Problem: Respiratory - Adult  Goal: Achieves optimal ventilation and oxygenation  Outcome: Progressing   
  Problem: Discharge Planning  Goal: Discharge to home or other facility with appropriate resources  Outcome: Progressing     Problem: Safety - Adult  Goal: Free from fall injury  Outcome: Progressing     Problem: ABCDS Injury Assessment  Goal: Absence of physical injury  Outcome: Progressing     Problem: Skin/Tissue Integrity  Goal: Absence of new skin breakdown  Description: 1.  Monitor for areas of redness and/or skin breakdown  2.  Assess vascular access sites hourly  3.  Every 4-6 hours minimum:  Change oxygen saturation probe site  4.  Every 4-6 hours:  If on nasal continuous positive airway pressure, respiratory therapy assess nares and determine need for appliance change or resting period.  Outcome: Progressing     Problem: Pain  Goal: Verbalizes/displays adequate comfort level or baseline comfort level  Outcome: Progressing     Problem: Confusion  Goal: Confusion, delirium, dementia, or psychosis is improved or at baseline  Description: INTERVENTIONS:  1. Assess for possible contributors to thought disturbance, including medications, impaired vision or hearing, underlying metabolic abnormalities, dehydration, psychiatric diagnoses, and notify attending LIP  2. [place] high risk fall precautions, as indicated  3. Provide frequent short contacts to provide reality reorientation, refocusing and direction  4. Decrease environmental stimuli, including noise as appropriate  5. Monitor and intervene to maintain adequate nutrition, hydration, elimination, sleep and activity  6. If unable to ensure safety without constant attention obtain sitter and review sitter guidelines with assigned personnel  7. Initiate Psychosocial CNS and Spiritual Care consult, as indicated  Outcome: Progressing  Flowsheets (Taken 6/27/2024 1925)  Effect of thought disturbance (confusion, delirium, dementia, or psychosis) are managed with adequate functional status:   Monitor and intervene to maintain adequate nutrition, hydration, 
  Problem: Discharge Planning  Goal: Discharge to home or other facility with appropriate resources  Outcome: Progressing  Flowsheets (Taken 7/2/2024 0852)  Discharge to home or other facility with appropriate resources: Identify barriers to discharge with patient and caregiver     Problem: Safety - Adult  Goal: Free from fall injury  Outcome: Progressing  Flowsheets (Taken 7/2/2024 0852)  Free From Fall Injury: Instruct family/caregiver on patient safety     Problem: ABCDS Injury Assessment  Goal: Absence of physical injury  Outcome: Progressing  Flowsheets (Taken 7/2/2024 0852)  Absence of Physical Injury: Implement safety measures based on patient assessment     Problem: Skin/Tissue Integrity  Goal: Absence of new skin breakdown  Description: 1.  Monitor for areas of redness and/or skin breakdown  2.  Assess vascular access sites hourly  3.  Every 4-6 hours minimum:  Change oxygen saturation probe site  4.  Every 4-6 hours:  If on nasal continuous positive airway pressure, respiratory therapy assess nares and determine need for appliance change or resting period.  Outcome: Progressing     Problem: Pain  Goal: Verbalizes/displays adequate comfort level or baseline comfort level  Outcome: Progressing  Flowsheets  Taken 7/2/2024 0852  Verbalizes/displays adequate comfort level or baseline comfort level: Encourage patient to monitor pain and request assistance  Taken 7/2/2024 0736  Verbalizes/displays adequate comfort level or baseline comfort level: Encourage patient to monitor pain and request assistance     Problem: Confusion  Goal: Confusion, delirium, dementia, or psychosis is improved or at baseline  Description: INTERVENTIONS:  1. Assess for possible contributors to thought disturbance, including medications, impaired vision or hearing, underlying metabolic abnormalities, dehydration, psychiatric diagnoses, and notify attending LIP  2. [place] high risk fall precautions, as indicated  3. Provide frequent 
Updated pt's daughter Falguni  
Updated pt's daughter, Falguni.   
LIP  2. [place] high risk fall precautions, as indicated  3. Provide frequent short contacts to provide reality reorientation, refocusing and direction  4. Decrease environmental stimuli, including noise as appropriate  5. Monitor and intervene to maintain adequate nutrition, hydration, elimination, sleep and activity  6. If unable to ensure safety without constant attention obtain sitter and review sitter guidelines with assigned personnel  7. Initiate Psychosocial CNS and Spiritual Care consult, as indicated  Outcome: Progressing  Flowsheets (Taken 6/25/2024 1925)  Effect of thought disturbance (confusion, delirium, dementia, or psychosis) are managed with adequate functional status: Assess for contributors to thought disturbance, including medications, impaired vision or hearing, underlying metabolic abnormalities, dehydration, psychiatric diagnoses, notify LIP     Problem: Neurosensory - Adult  Goal: Achieves stable or improved neurological status  Outcome: Progressing  Flowsheets (Taken 6/25/2024 1925)  Achieves stable or improved neurological status: Assess for and report changes in neurological status     Problem: Respiratory - Adult  Goal: Achieves optimal ventilation and oxygenation  Outcome: Progressing  Flowsheets (Taken 6/25/2024 1925)  Achieves optimal ventilation and oxygenation: Assess for changes in respiratory status     Problem: Cardiovascular - Adult  Goal: Maintains optimal cardiac output and hemodynamic stability  Outcome: Progressing  Flowsheets (Taken 6/25/2024 1925)  Maintains optimal cardiac output and hemodynamic stability: Monitor blood pressure and heart rate     Problem: Skin/Tissue Integrity - Adult  Goal: Skin integrity remains intact  Outcome: Progressing  Flowsheets (Taken 6/25/2024 1925)  Skin Integrity Remains Intact: Monitor for areas of redness and/or skin breakdown     Problem: Musculoskeletal - Adult  Goal: Return mobility to safest level of function  Outcome: 
adequate nutrition, hydration, elimination, sleep and activity  6. If unable to ensure safety without constant attention obtain sitter and review sitter guidelines with assigned personnel  7. Initiate Psychosocial CNS and Spiritual Care consult, as indicated  6/30/2024 2244 by Phuong Earl RN  Outcome: Progressing  6/30/2024 1310 by Tabatha Chan RN  Outcome: Progressing     Problem: Neurosensory - Adult  Goal: Achieves stable or improved neurological status  6/30/2024 2244 by Phuong Earl RN  Outcome: Progressing  6/30/2024 1310 by Tabatha Chan RN  Outcome: Progressing     Problem: Respiratory - Adult  Goal: Achieves optimal ventilation and oxygenation  6/30/2024 2244 by Phuong Earl RN  Outcome: Progressing  6/30/2024 1310 by Tabatha Chan RN  Outcome: Progressing     Problem: Cardiovascular - Adult  Goal: Maintains optimal cardiac output and hemodynamic stability  6/30/2024 2244 by Phuong Earl RN  Outcome: Progressing  6/30/2024 1310 by Tabatha Chan RN  Outcome: Progressing     Problem: Skin/Tissue Integrity - Adult  Goal: Skin integrity remains intact  6/30/2024 2244 by Phuong Earl RN  Outcome: Progressing  6/30/2024 1310 by Tabatha Chan RN  Outcome: Progressing     Problem: Musculoskeletal - Adult  Goal: Return mobility to safest level of function  6/30/2024 2244 by Phuong Earl RN  Outcome: Progressing  6/30/2024 1310 by Tabatha Chan RN  Outcome: Progressing     Problem: Gastrointestinal - Adult  Goal: Minimal or absence of nausea and vomiting  6/30/2024 2244 by Phuong Earl RN  Outcome: Progressing  6/30/2024 1310 by Tabatha Chan RN  Outcome: Progressing     Problem: Genitourinary - Adult  Goal: Absence of urinary retention  6/30/2024 2244 by Phuong Ealr RN  Outcome: Progressing  6/30/2024 1310 by Tabatha Chan RN  Outcome: Progressing     Problem: Infection - Adult  Goal: Absence of infection at discharge  6/30/2024 2244 by 
Progressing  6/28/2024 1150 by Patito Ibrahim RN  Outcome: Progressing     Problem: Metabolic/Fluid and Electrolytes - Adult  Goal: Electrolytes maintained within normal limits  6/29/2024 0027 by Phuong Earl RN  Outcome: Progressing  6/28/2024 1150 by Patito Ibrahim RN  Outcome: Progressing  Goal: Hemodynamic stability and optimal renal function maintained  6/29/2024 0027 by Phuong Earl RN  Outcome: Progressing  6/28/2024 1150 by Patito Ibrahim RN  Outcome: Progressing  Goal: Glucose maintained within prescribed range  6/29/2024 0027 by Phuong Earl RN  Outcome: Progressing  6/28/2024 1150 by Patito Ibrahim RN  Outcome: Progressing     Problem: Hematologic - Adult  Goal: Maintains hematologic stability  6/29/2024 0027 by Phuong Earl RN  Outcome: Progressing  6/28/2024 1150 by Patito Ibrahim RN  Outcome: Progressing

## 2024-07-02 NOTE — PROGRESS NOTES
Hospitalist Progress Note   Admit Date:  2024 12:49 PM   Name:  Jah Verdugo   Age:  84 y.o.  Sex:  male  :  1940   MRN:  [id number]   Room:  1/    Presenting/Chief Complaint: Fall     Reason(s) for Admission: Closed fracture of neck of right femur, initial encounter ([facility]) [S72.001A]  Closed head injury, initial encounter [S09.90XA]     Hospital Course:     Jah Verdugo is a 84 y.o. male with medical history of Alzheimer's dementia who presented after an unwitnessed fall last night.  Patient is a resident at the [facility].  Patient was unfortunately found down in his room early this morning.  X-ray of patient's femur showed right acute impacted subcapital femoral neck fracture.  ED provider contacted orthopedic surgery who asked hospitalist to admit for potential intervention following day.  CT head without any acute intracranial abnormality.  CT spine with no C-spine fracture.  Patient's status post fentanyl and IV hydration in the emergency department.  WBC of 19.9, CK of 88, platelet count of 942.    Subjective & 24hr Events:     Chatman placed overnight.  No BM yet. Still confused to situation.  Requiring a lot of prompting with PT and feeding.  Had aspiration event this morning. SLP consulted and recommended pureed diet. Daughter states this has been ongoing issues for some time.     Review of Systems - Unable to obtain due to dementia     Assessment & Plan:     Closed fracture of neck of right femur, initial encounter ([facility])  -Appreciate Orthopedic consultation  -Status post open treatment of right femoral neck fracture with plate and screw fixation 2024  -Resume diet  -VTE prophylaxis and wound care per Orthopedics  -Analgesia as needed  -PT and OT evaluation recommending SNF. Denied from several facilities. Still waiting to hear from [facility]    UTI  Urinary retention  No prior cultures per chart review   4 + bacteria and large LE with turbid appearance  Was started on  Ceftriaxone  
       Hospitalist Progress Note   Admit Date:  2024 12:49 PM   Name:  Jah Verdugo   Age:  84 y.o.  Sex:  male  :  1940   MRN:  [id number]   Room:  1/    Presenting/Chief Complaint: Fall     Reason(s) for Admission: Closed fracture of neck of right femur, initial encounter ([facility]) [S72.001A]  Closed head injury, initial encounter [S09.90XA]     Hospital Course:     Jah Verdugo is a 84 y.o. male with medical history of Alzheimer's dementia who presented after an unwitnessed fall last night.  Patient is a resident at the [facility].  Patient was unfortunately found down in his room early this morning.  X-ray of patient's femur showed right acute impacted subcapital femoral neck fracture.  ED provider contacted orthopedic surgery who asked hospitalist to admit for potential intervention following day.  CT head without any acute intracranial abnormality.  CT spine with no C-spine fracture.  Patient's status post fentanyl and IV hydration in the emergency department.  WBC of 19.9, CK of 88, platelet count of 942.    Subjective & 24hr Events:     Denied placement at multiple facilities. Pending response from [facility]. Possibly Monday?   Sitting up in the chair after breakfast.      Review of Systems - Unable to obtain due to dementia     Assessment & Plan:     Closed fracture of neck of right femur, initial encounter ([facility])  -Appreciate Orthopedic consultation  -Status post open treatment of right femoral neck fracture with plate and screw fixation 2024  -Resume diet  -VTE prophylaxis and wound care per Orthopedics  -Analgesia as needed  -PT and OT evaluation recommending SNF. Denied from several facilities. Still waiting to hear from [facility]    UTI  Urinary retention  No prior cultures per chart review   4 + bacteria and large LE with turbid appearance  Was started on  Ceftriaxone  (transition to po as appropriate)   No culture was ordered at admission   Chatman catheter placed  evening, will 
       Hospitalist Progress Note   Admit Date:  2024 12:49 PM   Name:  Jah Verdugo   Age:  84 y.o.  Sex:  male  :  1940   MRN:  [id number]   Room:  1/    Presenting/Chief Complaint: Fall     Reason(s) for Admission: Closed fracture of neck of right femur, initial encounter ([facility]) [S72.001A]  Closed head injury, initial encounter [S09.90XA]     Hospital Course:     Jah Verdugo is a 84 y.o. male with medical history of Alzheimer's dementia who presented after an unwitnessed fall last night.  Patient is a resident at the [facility].  Patient was unfortunately found down in his room early this morning.  X-ray of patient's femur showed right acute impacted subcapital femoral neck fracture.  ED provider contacted orthopedic surgery who asked hospitalist to admit for potential intervention following day.  CT head without any acute intracranial abnormality.  CT spine with no C-spine fracture.  Patient's status post fentanyl and IV hydration in the emergency department.  WBC of 19.9, CK of 88, platelet count of 942.    Subjective & 24hr Events:     More drowsy this morning. Will attempt voiding trials this am. Nursing communication sent.     Review of Systems - Unable to obtain due to dementia     Assessment & Plan:     Closed fracture of neck of right femur, initial encounter ([facility])  -Appreciate Orthopedic consultation  -Status post open treatment of right femoral neck fracture with plate and screw fixation 2024  -Resume diet  -VTE prophylaxis and wound care per Orthopedics  -Analgesia as needed  -PT and OT evaluation recommending SNF. Still waiting to hear from [facility]    UTI  Urinary retention  No prior cultures per chart review   4 + bacteria and large LE with turbid appearance  Was started on  Ceftriaxone  (transition to po as appropriate)  -completed 5 days therapy  No culture was ordered at admission   Chatman catheter placed  evening,   attempt voiding trials  .       Alzheimer's 
       Hospitalist Progress Note   Admit Date:  2024 12:49 PM   Name:  Jah Verdugo   Age:  84 y.o.  Sex:  male  :  1940   MRN:  [id number]   Room:  1/    Presenting/Chief Complaint: Fall     Reason(s) for Admission: Closed fracture of neck of right femur, initial encounter ([facility]) [S72.001A]  Closed head injury, initial encounter [S09.90XA]     Hospital Course:     Jah Verdugo is a 84 y.o. male with medical history of Alzheimer's dementia who presented after an unwitnessed fall last night.  Patient is a resident at the [facility].  Patient was unfortunately found down in his room early this morning.  X-ray of patient's femur showed right acute impacted subcapital femoral neck fracture.  ED provider contacted orthopedic surgery who asked hospitalist to admit for potential intervention following day.  CT head without any acute intracranial abnormality.  CT spine with no C-spine fracture.  Patient's status post fentanyl and IV hydration in the emergency department.  WBC of 19.9, CK of 88, platelet count of 942.    Subjective & 24hr Events:     Aguilera replaced, failed voiding trial. Sitter reports pt ate his full breakfast.     Review of Systems - Unable to obtain due to dementia     Assessment & Plan:     Closed fracture of neck of right femur, initial encounter ([facility])  -Appreciate Orthopedic consultation  -Status post open treatment of right femoral neck fracture with plate and screw fixation 2024  -Resume diet  -VTE prophylaxis and wound care per Orthopedics  -Analgesia as needed  -PT and OT evaluation recommending SNF. Still waiting to hear from [facility]    UTI  Urinary retention  No prior cultures per chart review   4 + bacteria and large LE with turbid appearance  Was started on  Ceftriaxone  (transition to po as appropriate)  -completed 5 days therapy  No culture was ordered at admission   Aguilera catheter placed  evening,   attempt voiding trials  . Failed and aguilera replaced. Will need 
  ACUTE PHYSICAL THERAPY GOALS:   (Developed with and agreed upon by patient and/or caregiver.)  Pt will perform bed mobility with SBA in 7 therapy sessions.  Pt will perform sit-to-stand/ stand-to-sit transfers CGA in 7 therapy sessions.  Pt will ambulate 75 ft CGA with use of LRAD/no device and breaks as needed in 7 therapy sessions.  Pt will tolerate 5 minutes of standing activity with LRAD/no device in 7 therapy sessions.  Pt will perform standing dynamic balance activities with minimal postural sway in 7 therapy sessions.  Pt will tolerate multiple sets and reps of BLE exercises in 7 therapy sessions.     PHYSICAL THERAPY: Daily Note AM   (Link to Caseload Tracking: PT Visit Days : 2  Time In/Out PT Charge Capture  Rehab Caseload Tracker  Orders    Jah Verdugo is a 84 y.o. male   PRIMARY DIAGNOSIS: Closed fracture of neck of right femur, initial encounter ([facility])  Closed fracture of neck of right femur, initial encounter ([facility]) [S72.001A]  Closed head injury, initial encounter [S09.90XA]  Procedure(s) (LRB):  HIP OPEN REDUCTION INTERNAL FIXATION FNS (Right)  2 Days Post-Op  Inpatient: Payor: [facility] MEDICARE / Plan: [facility] MEDICARE ADVANTAGE / Product Type: *No Product type* /     ASSESSMENT:     REHAB RECOMMENDATIONS:   Recommendation to date pending progress:  Setting:  Short-term Rehab    Equipment:    To Be Determined     ASSESSMENT:  Mr. Verdugo was supine on contact and agreeable to work with therapy. He sat up with min a. Scooted to edge to get feet on floor with cues. He was able to stand to RW and amb 60' x 2 with a chair follow and min a x 2. He needed repeated cues for walker use and safety and to stay on task.  Returned to room in chair.      SUBJECTIVE:   Mr. Verdugo states \"Hi. I'm Don.\"     Social/Functional Lives With: Other (comment) ([facility])  Type of Home: Assisted living  OBJECTIVE:     PAIN: VITALS / O2: PRECAUTION / LINES / DRAINS:   Pre Treatment:  Did not rate   
  ACUTE PHYSICAL THERAPY GOALS:   (Developed with and agreed upon by patient and/or caregiver.)  Pt will perform bed mobility with SBA in 7 therapy sessions.  Pt will perform sit-to-stand/ stand-to-sit transfers CGA in 7 therapy sessions.  Pt will ambulate 75 ft CGA with use of LRAD/no device and breaks as needed in 7 therapy sessions.  Pt will tolerate 5 minutes of standing activity with LRAD/no device in 7 therapy sessions.  Pt will perform standing dynamic balance activities with minimal postural sway in 7 therapy sessions.  Pt will tolerate multiple sets and reps of BLE exercises in 7 therapy sessions.     PHYSICAL THERAPY: Daily Note AM   (Link to Caseload Tracking: PT Visit Days : 5  Time In/Out PT Charge Capture  Rehab Caseload Tracker  Orders    Jah Verdugo is a 84 y.o. male   PRIMARY DIAGNOSIS: Closed fracture of neck of right femur, initial encounter ([facility])  Closed fracture of neck of right femur, initial encounter ([facility]) [S72.001A]  Closed head injury, initial encounter [S09.90XA]  Procedure(s) (LRB):  HIP OPEN REDUCTION INTERNAL FIXATION FNS (Right)  6 Days Post-Op  Inpatient: Payor: [facility] MEDICARE / Plan: [facility] MEDICARE ADVANTAGE / Product Type: *No Product type* /     ASSESSMENT:     REHAB RECOMMENDATIONS:   Recommendation to date pending progress:  Setting:  Short-term Rehab    Equipment:    To Be Determined     ASSESSMENT:  Mr. Verdugo is making  slow progress  toward  goals.  He remains very confused and  requires  additional time  for all mobility and cueing.  He demonstrated good sitting balance once EOB and spent extended time combing his hair.   The patient performed STS with the RW initially, but presented with a strong posterior  lean and returned to sitting.  He then stood and performed SPT to the  recliner with Bilateral HHA and small shuffled steps.  The patient is  moslty limited by cognitive status and  decreased command  following.       SUBJECTIVE:   Mr. Verdugo states \"what did you 
  ACUTE PHYSICAL THERAPY GOALS:   (Developed with and agreed upon by patient and/or caregiver.)  Pt will perform bed mobility with SBA in 7 therapy sessions.  Pt will perform sit-to-stand/ stand-to-sit transfers CGA in 7 therapy sessions.  Pt will ambulate 75 ft CGA with use of LRAD/no device and breaks as needed in 7 therapy sessions.  Pt will tolerate 5 minutes of standing activity with LRAD/no device in 7 therapy sessions.  Pt will perform standing dynamic balance activities with minimal postural sway in 7 therapy sessions.  Pt will tolerate multiple sets and reps of BLE exercises in 7 therapy sessions.     PHYSICAL THERAPY: Daily Note PM   (Link to Caseload Tracking: PT Visit Days : 2  Time In/Out PT Charge Capture  Rehab Caseload Tracker  Orders    Jah Verdugo is a 84 y.o. male   PRIMARY DIAGNOSIS: Closed fracture of neck of right femur, initial encounter ([facility])  Closed fracture of neck of right femur, initial encounter ([facility]) [S72.001A]  Closed head injury, initial encounter [S09.90XA]  Procedure(s) (LRB):  HIP OPEN REDUCTION INTERNAL FIXATION FNS (Right)  2 Days Post-Op  Inpatient: Payor: [facility] MEDICARE / Plan: [facility] MEDICARE ADVANTAGE / Product Type: *No Product type* /     ASSESSMENT:     REHAB RECOMMENDATIONS:   Recommendation to date pending progress:  Setting:  Short-term Rehab    Equipment:    To Be Determined     ASSESSMENT:  Mr. Verdugo was up in chair from AM treament on contact and agreeable to work with therapy wanting to get back Sit to stand x 2 with HHA x 2 and turned to sit EOB with max a x 2 with pt pulling back and leaning posteriorly. He took short choppy steps. Once on EOB he was asked to stand and take side steps toward HOB which again took max a x 2. EOB to supine with max a x 2 and positioned for comfort.  He needed repeated cues for safety and to complete task. Appears more confused in PM.      SUBJECTIVE:   Mr. Verdugo states \"Thank you for coming\".     Social/Functional Lives With: 
  [facility]        Consult Received:       Closed right hip fracture. Patient discussed with Dr. Melendrez. Will tentatively plan for surgery for tomorrow. Hospitalist to admit. NPO after midnight. Hold blood thinners.     
  [facility]        2024         Post Op day: 2 Days Post-Op Procedure(s) (LRB):  HIP OPEN REDUCTION INTERNAL FIXATION FNS (Right)      Admit Date: 2024  Admit Diagnosis: Closed fracture of neck of right femur, initial encounter ([facility]) [S72.001A]  Closed head injury, initial encounter [S09.90XA]       Principle Problem: Closed fracture of neck of right femur, initial encounter ([facility]).           Subjective: Doing well, No complaints, No SOB, No Chest Pain, No Nausea or Vomiting     Objective:   Vital Signs are Stable, No Acute Distress, Alert,  Dressing is Dry,  Neurovascular exam is normal.     Assessment / Plan :  Patient Active Problem List   Diagnosis    Alzheimer disease ([facility])    Closed fracture of neck of right femur, initial encounter ([facility])    Closed right hip fracture, initial encounter ([facility])    Patient Vitals for the past 8 hrs:   BP Temp Temp src Pulse Resp SpO2   24 0739 118/72 97.9 °F (36.6 °C) Oral 67 18 96 %    Temp (24hrs), Av.8 °F (36.6 °C), Min:97.7 °F (36.5 °C), Max:97.9 °F (36.6 °C)    Body mass index is 23.4 kg/m².    Lab Results   Component Value Date/Time    HGB 11.2 2024 04:49 AM          S/P Procedure(s) (LRB):  HIP OPEN REDUCTION INTERNAL FIXATION FNS (Right)        Vit D 2000iu daily x 12 weeks   Medical Mgmt per hospitalist  Anticoagulation plan: ASA 325mg daily  Continue PT, WBAT   Dressing change: Dressing change POD 2 to telfa, 4x4, ABD and tape  Fall Precautions  DC disp: rehab placement   F/U: 2 weeks postop for wound check and staple removals          Signed By: EFRA Martinez  2024,  10:27 AM      
  Physical therapy note: Attempted PT this AM. Pt appears confused and not cooperating. Not following commands. Unable to get pt to participate with therapy.   Will continue to treat as pt is able and time/schedule permit.    PM: Attempted again in PM. Same situation. Continues to refuse.     Hui Hamilton PTA    Rehab Caseload Tracker   
  Physician Progress Note      PATIENT:               ИРИНА CARDONA  CSN #:                  [id number]  :                       1940  ADMIT DATE:       2024 12:49 PM  DISCH DATE:  RESPONDING  PROVIDER #:        Tova Francois MD          QUERY TEXT:    Pt admitted with Closed fracture of neck of right femur. Pt noted to have an   osteoporotic hip. If possible, please document in progress notes and discharge   summary if you are evaluating and/or treating any of the following:    The medical record reflects the following:  Risk Factors: Alzheimer's dementia  Clinical Indicators: X-ray of patient's femur showed right acute impacted   subcapital femoral neck fracture.  OP Note- \"After the successful induction of general anesthesia the right lower   extremity was placed in boot traction on a fracture table.  At that point we   made sure we could visualize the hip on both the AP and lateral projection   with the C arm image intensifier.  Once this was confirmed we then prepped and   draped the osteoporotic hip.\"  Treatment: Vit D 2000iu daily x 12 weeks, Open treatment of right femoral neck   fracture with plate and screw fixation  Options provided:  -- Pathological right femur fracture  -- Osteoporotic right femur Fracture  -- Osteoporotic right femur fracture following fall which would not usually   break a normal, healthy bone  -- Traumatic right femur fracture  -- Other - I will add my own diagnosis  -- Disagree - Not applicable / Not valid  -- Disagree - Clinically unable to determine / Unknown  -- Refer to Clinical Documentation Reviewer    PROVIDER RESPONSE TEXT:    This patient has a traumatic right femur fracture.    Query created by: Liudmila Robin on 2024 2:10 PM      Electronically signed by:  Tova Francois MD 2024 6:58 AM          
  [facility]        2024         Post Op day: 1 Day Post-Op Procedure(s) (LRB):  HIP OPEN REDUCTION INTERNAL FIXATION FNS (Right)      Admit Date: 2024  Admit Diagnosis: Closed fracture of neck of right femur, initial encounter ([facility]) [S72.001A]  Closed head injury, initial encounter [S09.90XA]       Principle Problem: Closed fracture of neck of right femur, initial encounter ([facility]).           Subjective: Doing well, No complaints, No SOB, No Chest Pain, No Nausea or Vomiting     Objective:   Vital Signs are Stable, No Acute Distress, Alert,  Dressing is Dry,  Neurovascular exam is normal.     Assessment / Plan :  Patient Active Problem List   Diagnosis    Alzheimer disease ([facility])    Closed fracture of neck of right femur, initial encounter ([facility])    Closed right hip fracture, initial encounter ([facility])    Patient Vitals for the past 8 hrs:   BP Temp Temp src Pulse Resp SpO2 Weight   24 0703 126/85 98.1 °F (36.7 °C) Oral 88 19 93 % --   24 0440 -- -- -- -- -- -- 63.4 kg (139 lb 12.8 oz)   24 0352 109/79 98.6 °F (37 °C) Oral 84 17 91 % --    Temp (24hrs), Av.5 °F (36.9 °C), Min:98.1 °F (36.7 °C), Max:99 °F (37.2 °C)    Body mass index is 23.26 kg/m².    Lab Results   Component Value Date/Time    HGB 11.2 2024 04:49 AM          S/P Procedure(s) (LRB):  HIP OPEN REDUCTION INTERNAL FIXATION FNS (Right)      Vit D 2000iu daily x 12 weeks   Medical Mgmt per hospitalist  Anticoagulation plan: ASA 325mg daily  Continue PT, WBAT   Dressing change: Dressing change POD 2 to telfa, 4x4, ABD and tape  Fall Precautions  DC disp: rehab placement   F/U: 2 weeks postop for wound check and staple removals       Signed By: EFRA Martinez  2024,  9:32 AM      
ACUTE OCCUPATIONAL THERAPY GOALS:   (Developed with and agreed upon by patient and/or caregiver.)  1. Patient will complete upper body bathing and dressing with STAND BY ASSIST and adaptive equipment as needed.  2. Patient will complete lower body bathing and dressing with MINIMAL ASSIST and adaptive equipment as needed.     3. Patient will complete toileting with MINIMAL ASSIST.  4. Patient will complete grooming ADL standing at sink with CONTACT GUARD ASSIST.  5. Patient will complete functional transfers with MINIMAL ASSIST and adaptive equipment as needed.   6. Patient will follow 90% of one-step commands during functional task.     Timeframe: 7 visits     OCCUPATIONAL THERAPY: Daily Note AM   OT Visit Days: 3   Time In/Out  OT Charge Capture  Rehab Caseload Tracker  OT Orders    Jah Verdugo is a 84 y.o. male   PRIMARY DIAGNOSIS: Closed fracture of neck of right femur, initial encounter ([facility])  Closed fracture of neck of right femur, initial encounter ([facility]) [S72.001A]  Closed head injury, initial encounter [S09.90XA]  Procedure(s) (LRB):  HIP OPEN REDUCTION INTERNAL FIXATION FNS (Right)  4 Days Post-Op  Inpatient: Payor: [facility] MEDICARE / Plan: [employer] MEDICARE ADVANTAGE / Product Type: *No Product type* /     ASSESSMENT:     REHAB RECOMMENDATIONS:   Recommendation to date pending progress:  Setting:  Short-term Rehab - at his facility if able    Equipment:    To Be Determined - may need RW     ASSESSMENT:  Mr. Verdugo presents in supine upon arrival with sitter at his side. Pt completed all mobility with overall mod/max a x 2 with a rolling walker. Pt presents with confusion, decreased activity tolerance, weakness, and decreased independence with self care activities. Pt was left up in the chair with belongings in reach. Good effort. Continue POC.       SUBJECTIVE:     Mr. Verdugo states, \"Okay.\"     Social/Functional Lives With: Other (comment) ([facility])  Type of Home: Assisted 
ACUTE OCCUPATIONAL THERAPY GOALS:   (Developed with and agreed upon by patient and/or caregiver.)  1. Patient will complete upper body bathing and dressing with STAND BY ASSIST and adaptive equipment as needed.  2. Patient will complete lower body bathing and dressing with MINIMAL ASSIST and adaptive equipment as needed.     3. Patient will complete toileting with MINIMAL ASSIST.  4. Patient will complete grooming ADL standing at sink with CONTACT GUARD ASSIST.  5. Patient will complete functional transfers with MINIMAL ASSIST and adaptive equipment as needed.   6. Patient will follow 90% of one-step commands during functional task.     Timeframe: 7 visits     OCCUPATIONAL THERAPY: Daily Note AM   OT Visit Days: 4   Time In/Out  OT Charge Capture  Rehab Caseload Tracker  OT Orders    Jah Verdugo is a 84 y.o. male   PRIMARY DIAGNOSIS: Closed fracture of neck of right femur, initial encounter ([facility])  Closed fracture of neck of right femur, initial encounter ([facility]) [S72.001A]  Closed head injury, initial encounter [S09.90XA]  Procedure(s) (LRB):  HIP OPEN REDUCTION INTERNAL FIXATION FNS (Right)  5 Days Post-Op  Inpatient: Payor: [facility] MEDICARE / Plan: [employer] MEDICARE ADVANTAGE / Product Type: *No Product type* /     ASSESSMENT:     REHAB RECOMMENDATIONS:   Recommendation to date pending progress:  Setting:  Short-term Rehab - at his facility if able    Equipment:    To Be Determined - may need RW     ASSESSMENT:  Mr. Verdugo presents in supine upon arrival. Bed mobility was initiated and pt became very agitated, cursing and saying he could get up by himself. However, he did not assist at all with mobility. Pt was positioned up in the bed and his tray was set up for him. He was not feeding himself so nursing was assisting him. Pt left in supine with sitter and Rn in the room. No progress this session. Continue POC.       SUBJECTIVE:     Mr. Verdugo states, \"I can move by my damn self when I'm ready!\" 
ACUTE PHYSICAL THERAPY GOALS:   (Developed with and agreed upon by patient and/or caregiver.)  Pt will perform bed mobility with SBA in 7 therapy sessions.  Pt will perform sit-to-stand/ stand-to-sit transfers CGA in 7 therapy sessions.  Pt will ambulate 75 ft CGA with use of LRAD/no device and breaks as needed in 7 therapy sessions.  Pt will tolerate 5 minutes of standing activity with LRAD/no device in 7 therapy sessions.  Pt will perform standing dynamic balance activities with minimal postural sway in 7 therapy sessions.  Pt will tolerate multiple sets and reps of BLE exercises in 7 therapy sessions.     PHYSICAL THERAPY Initial Assessment and PM  (Link to Caseload Tracking: PT Visit Days : 1  Acknowledge Orders  Time In/Out  PT Charge Capture  Rehab Caseload Tracker    Jah Verdugo is a 84 y.o. male   PRIMARY DIAGNOSIS: Closed fracture of neck of right femur, initial encounter ([facility])  Closed fracture of neck of right femur, initial encounter ([facility]) [S72.001A]  Closed head injury, initial encounter [S09.90XA]  Procedure(s) (LRB):  HIP OPEN REDUCTION INTERNAL FIXATION FNS (Right)  1 Day Post-Op  Reason for Referral: Other abnormalities of gait and mobility (R26.89)  Inpatient: Payor: [facility] MEDICARE / Plan: [facility] MEDICARE ADVANTAGE / Product Type: *No Product type* /     ASSESSMENT:     REHAB RECOMMENDATIONS:   Recommendation to date pending progress:  Setting:  Short-term Rehab    Equipment:    To Be Determined     ASSESSMENT:  Mr. Verdugo is a 84 y.o. male presenting to PT following a hospitalization due to a fall and subsequent R femoral neck fx. Pt is not POD 1# the above procedure and is WBAT RLE per ortho. PLOF largely unknown, as pt is oriented only to self with no family in room. Per chart, pt lives at [facility] and requires assistance for ADLs. At time of initial evaluation, pt presents with deficits in bed mobility, strength, transfers, balance, gait and activity tolerance limiting overall 
Chatman removed at 1000. Bladder scan at 1600 208ml after no urinary output. Hospitalist notified and instructed to bladder scan in 6 hours and if still retaining, re-insert indwelling urinary catheter.   
Physical Therapy Note:    Attempted to see patient this AM for physical therapy evaluation session. Patient confused and becoming agitated/not following commands during this evaluation attempt.. Will follow and re-attempt as schedule permits/patient available. Thank you,    Hussain Horton, PT     Rehab Caseload Tracker   
Physical Therapy Note:    Attempted to see patient this PM for physical therapy treatment  session. Patient declined therapy despite encouragement, refusing to do any activity. Will follow and re-attempt as schedule permits/patient available. Thank you,    Richie Sawyer, PT     Rehab Caseload Tracker   
Physical Therapy Note:    Attempted to see patient this PM for physical therapy treatment  session. Patient wanted to stay up in chair at this time, refusing to do any activity in chair for exercises. Will follow and re-attempt as schedule permits/patient available. Thank you,    Jessica Juarez, PT     Rehab Caseload Tracker   
Physical Therapy Note:    Physical therapy evaluation orders received and chart reviewed. Patient admitted with right hip fracture with plan for surgical repair today. Will evaluate post operatively day #1 per orthopedic surgery. Thank you,    Emi Rodriguez, PT, DPT      
Pt unable to urinate with distention noted per this RN. MD Sonia Lopez notified with new orders to place Indwelling urinary catheter. Chatman cath placed (see LDA). PT tolerated well.   
TRANSFER - IN REPORT:    Verbal report received from Ty RN on Jah Verdugo  being received from  ED for routine progression of patient care      Report consisted of patient's Situation, Background, Assessment and   Recommendations(SBAR).     Information from the following report(s) Nurse Handoff Report was reviewed.    Opportunity for questions and clarification was provided.      Assessment completed upon patient's arrival to unit and care assumed.    
TRANSFER - OUT REPORT:    Verbal report given to JEOVANNY Otoole on Jah Verdugo  being transferred to Pre-op for ordered procedure       Report consisted of patient's Situation, Background, Assessment and   Recommendations(SBAR).     Information from the following report(s) Nurse Handoff Report, Adult Overview, and MAR was reviewed with the receiving nurse.           Lines:   Peripheral IV 06/25/24 Distal;Right Forearm (Active)   Site Assessment Clean, dry & intact 06/25/24 1925   Line Status Infusing 06/25/24 1925   Line Care Cap changed 06/25/24 1925   Phlebitis Assessment No symptoms 06/25/24 1925   Infiltration Assessment 0 06/25/24 1925   Alcohol Cap Used Yes 06/25/24 1925   Dressing Status Clean, dry & intact 06/25/24 1925   Dressing Type Transparent 06/25/24 1925        Opportunity for questions and clarification was provided.      Patient transported with:  transport       
hospitalization; Discussed options for puree diet and family states puree foods may be better tolerated is source of difficulty is related to GI dysfunction.     Recommend puree diet with thin liquids. Provide assistance with PO intake. Small bites/sips, reduced rate, alternate solids and liquids.     Speech therapy will follow up x's 1 for diet tolerance given coughing with PO intake during AM. Do not anticipate need for ST services post acute care.     GENERAL    Subjective: Patient awake, alert when engaged. Sitter and family at bedside.     Recent Information/Background: Jah Verdugo is a 84 y.o. male with medical history of Alzheimer's dementia who presented after an unwitnessed fall last night. Patient is a resident at the [facility]. Patient was unfortunately found down in his room early this morning. X-ray of patient's femur showed right acute impacted subcapital femoral neck fracture. ED provider contacted orthopedic surgery who asked hospitalist to admit for potential intervention following day. CT head without any acute intracranial abnormality. CT spine with no C-spine fracture. Patient's status post fentanyl and IV hydration in the emergency department. WBC of 19.9, CK of 88, platelet count of 942.     History of Present Injury/Illness: Mr. Verdugo  has a past medical history of Acute respiratory failure with hypoxia (HCC), Chest pain, Hematuria, microscopic, Memory loss, Pneumonia due to COVID-19 virus, and PUD (peptic ulcer disease).. He also  has a past surgical history that includes pr appendectomy; hernia repair; and hip surgery (Right, 6/26/2024).  Precautions/Allergies: Patient has no known allergies.     Observations: Oriented to self only. Distractible to internal/external stimulation. Patient at baseline regarding cognitive- communicative function.     Prior Dysphagia History: Family reports patient will exhibit coughing episode occasionally with PO intake.  Modified barium swallow study performed 
Assistance, NT=Not Tested    BALANCE: Good Fair+ Fair Fair- Poor NT Comments   Sitting Static [] [] [x] [] [] []    Sitting Dynamic [] [] [x] [] [] []              Standing Static [] [] [x] [] [] []    Standing Dynamic [] [] [x] [] [] []        PLAN:     FREQUENCY/DURATION   OT Plan of Care: 5 times/week for duration of hospital stay or until stated goals are met, whichever comes first.    TREATMENT:     TREATMENT:   Co-Treatment PT/OT necessary due to patient's decreased overall endurance/tolerance levels, as well as need for high level skilled assistance to complete functional transfers/mobility and functional tasks  Neuromuscular Re-education (24 Minutes): Patient participated in neuromuscular re-education including weight shifting, postural training, midline training, standing tolerance activity , and sitting balance activity   with moderate assistance, verbal cues, tactile cues, education, and adaptive equipment to improve sitting balance, standing balance, posture, coordination, static balance, and dynamic balance in order to prepare for seated ADLs, prepare for standing ADLs, and prepare for functional transfer.     TREATMENT GRID:  N/A    AFTER TREATMENT PRECAUTIONS: Alarm Activated, Bed, Bed/Chair Locked, Call light within reach, Needs within reach, RN notified, and sitter    INTERDISCIPLINARY COLLABORATION:  RN/ PCT and PT/ PTA    EDUCATION:       TOTAL TREATMENT DURATION AND TIME:  Time In: 0846  Time Out: 0910  Minutes: 24    TANESHA Falk            
Assistance, NT=Not Tested    PLAN:   FREQUENCY AND DURATION: BID for duration of hospital stay or until stated goals are met, whichever comes first.    TREATMENT:   TREATMENT:   Co-Treatment PT/OT necessary due to patient's decreased overall endurance/tolerance levels, as well as need for high level skilled assistance to complete functional transfers/mobility and functional tasks  Therapeutic Activity (25 Minutes): Therapeutic activity included Supine to Sit, Scooting, Transfer Training, Ambulation on level ground, Sitting balance , and Standing balance to improve functional Activity tolerance, Balance, Mobility, and Strength.    TREATMENT GRID:  N/A    AFTER TREATMENT PRECAUTIONS: Alarm Activated, Bed/Chair Locked, Call light within reach, Chair, Needs within reach, RN notified, and sitter present    INTERDISCIPLINARY COLLABORATION:  RN/ PCT and OT/ PEDERSEN    EDUCATION: Education Given To: Patient  Education Provided: Orientation;Fall Prevention Strategies;Transfer Training  Education Method: Verbal  Barriers to Learning: Cognition  Education Outcome: Continued education needed    TIME IN/OUT:  Time In: 0911  Time Out: 0936  Minutes: 25    Richie Sawyer PT   
I=Modified Independent, S=Supervision, SBA=Standby Assistance, CGA=Contact Guard Assistance,   Min=Minimal Assistance, Mod=Moderate Assistance, Max=Maximal Assistance, Total=Total Assistance, NT=Not Tested    PLAN:   FREQUENCY AND DURATION: BID for duration of hospital stay or until stated goals are met, whichever comes first.    TREATMENT:   TREATMENT:   Therapeutic Activity (27 Minutes): Therapeutic activity included Supine to Sit, Scooting, Transfer Training, Ambulation on level ground, Sitting balance , and Standing balance to improve functional Activity tolerance, Balance, Mobility, and Strength.    TREATMENT GRID:  N/A    AFTER TREATMENT PRECAUTIONS: Alarm Activated, Bed/Chair Locked, Call light within reach, Chair, Needs within reach, and RN notified    INTERDISCIPLINARY COLLABORATION:  RN/ PCT and PT/ PTA    EDUCATION: Education Given To: Patient  Education Provided: Transfer Training  Education Method: Verbal  Barriers to Learning: Cognition  Education Outcome: Continued education needed    TIME IN/OUT:  Time In: 0847  Time Out: 0914  Minutes: 27    Jessica Juarez, PT   
required, increase activity tolerance, and increase safety awareness.     TREATMENT GRID:  N/A    AFTER TREATMENT PRECAUTIONS: Bed/Chair Locked, Call light within reach, Chair, Heels floated, Needs within reach, RN notified, and sitter    INTERDISCIPLINARY COLLABORATION:  RN/ PCT and PT/ PTA    EDUCATION:       TOTAL TREATMENT DURATION AND TIME:  Time In: 1033  Time Out: 1056  Minutes: 23    Mona Rosas OT            
IntraVENous Q24H    sodium chloride flush 0.9 % injection 5-40 mL  5-40 mL IntraVENous 2 times per day    sodium chloride flush 0.9 % injection 5-40 mL  5-40 mL IntraVENous PRN    0.9 % sodium chloride infusion   IntraVENous PRN    ondansetron (ZOFRAN-ODT) disintegrating tablet 4 mg  4 mg Oral Q8H PRN    Or    ondansetron (ZOFRAN) injection 4 mg  4 mg IntraVENous Q6H PRN    ceFAZolin (ANCEF) 2000 mg in sterile water 20 mL IV syringe  2,000 mg IntraVENous Q8H    oxyCODONE (ROXICODONE) immediate release tablet 5 mg  5 mg Oral Q4H PRN    HYDROmorphone HCl PF (DILAUDID) injection 0.5 mg  0.5 mg IntraVENous Q2H PRN    aspirin EC tablet 325 mg  325 mg Oral BID    sodium chloride flush 0.9 % injection 5-40 mL  5-40 mL IntraVENous 2 times per day    sodium chloride flush 0.9 % injection 5-40 mL  5-40 mL IntraVENous PRN    0.9 % sodium chloride infusion   IntraVENous PRN    potassium chloride (KLOR-CON M) extended release tablet 40 mEq  40 mEq Oral PRN    Or    potassium bicarb-citric acid (EFFER-K) effervescent tablet 40 mEq  40 mEq Oral PRN    Or    potassium chloride 10 mEq/100 mL IVPB (Peripheral Line)  10 mEq IntraVENous PRN    magnesium sulfate 2000 mg in 50 mL IVPB premix  2,000 mg IntraVENous PRN    ondansetron (ZOFRAN-ODT) disintegrating tablet 4 mg  4 mg Oral Q8H PRN    Or    ondansetron (ZOFRAN) injection 4 mg  4 mg IntraVENous Q6H PRN    polyethylene glycol (GLYCOLAX) packet 17 g  17 g Oral Daily PRN    acetaminophen (TYLENOL) tablet 650 mg  650 mg Oral Q6H PRN    Or    acetaminophen (TYLENOL) suppository 650 mg  650 mg Rectal Q6H PRN    0.9 % sodium chloride infusion   IntraVENous Continuous    morphine sulfate (PF) injection 4 mg  4 mg IntraVENous Q4H PRN    QUEtiapine (SEROQUEL) tablet 25 mg  25 mg Oral BID PRN       Signed:  Silvano Barajas MD    Part of this note may have been written by using a voice dictation software.  The note has been proof read but may still contain some grammatical/other 
5-40 mL  5-40 mL IntraVENous PRN    0.9 % sodium chloride infusion   IntraVENous PRN    potassium chloride (KLOR-CON M) extended release tablet 40 mEq  40 mEq Oral PRN    Or    potassium bicarb-citric acid (EFFER-K) effervescent tablet 40 mEq  40 mEq Oral PRN    Or    potassium chloride 10 mEq/100 mL IVPB (Peripheral Line)  10 mEq IntraVENous PRN    magnesium sulfate 2000 mg in 50 mL IVPB premix  2,000 mg IntraVENous PRN    ondansetron (ZOFRAN-ODT) disintegrating tablet 4 mg  4 mg Oral Q8H PRN    Or    ondansetron (ZOFRAN) injection 4 mg  4 mg IntraVENous Q6H PRN    polyethylene glycol (GLYCOLAX) packet 17 g  17 g Oral Daily PRN    acetaminophen (TYLENOL) tablet 650 mg  650 mg Oral Q6H PRN    Or    acetaminophen (TYLENOL) suppository 650 mg  650 mg Rectal Q6H PRN    morphine sulfate (PF) injection 4 mg  4 mg IntraVENous Q4H PRN    QUEtiapine (SEROQUEL) tablet 25 mg  25 mg Oral BID PRN       Signed:  Tova Hatch MD    Part of this note may have been written by using a voice dictation software.  The note has been proof read but may still contain some grammatical/other typographical errors.

## 2024-07-02 NOTE — CARE COORDINATION
Bed offer received from [facility].  Bed offer selected as this was daughter's primary choice.  CM submitted for insurance authorization on [employer] portal with immediate approval for 7/2-7/4.  Auth ID [id number].  Call placed to patient's daughter VM left with update.  Facility anticipates they will have bed available on Tuesday.   
F/U message sent to last facility choice, [facility], regarding bed offer.  They will continue to follow and bed should be available Monday.  They do note patient \"behaviors\" and will need to monitor this.     
Follow up messages sent to [facility] regarding bed availability.    
Patient is medically ready for discharge per attending and facility can accept today.  Patient will discharge at 15:00 to room 313 at [facility] via [facility] transport; reference number [id number].  Primary RN can call report to [phone].  Per daughter's request, son-in-law was updated over phone with discharge plan (Daryl at [phone].) 2nd ILM completed and was sent via [employer] to [email or web address].         07/02/24 1151   Services At/After Discharge   Transition of Care Consult (CM Consult) SNF   Partner SNF No   Reason Why Partner SNF Not Chosen Location   Services At/After Discharge Skilled Nursing Facility (SNF);Transport;In ambulance   Mode of Transport at Discharge [facility]   Hospital Transport Time of Discharge 1500   Confirm Follow Up Transport Family   Condition of Participation: Discharge Planning   The Plan for Transition of Care is related to the following treatment goals: Patient will participate in STR therapies in order to return to safe baseline independence in ADLs at [facility].   The Patient and/Or Patient Representative agree with the Discharge Plan? Yes       
[place] and [facility] have declined to accept patient. Decision pending from [facility].  CM following.  
secondary to patient's dementia)   Financial Resources Medicare  ([facility] Medicare)   Community Resources None   CM/SW Referral Other (see comment)  (Transitions of care planning)   Social/Functional History   Lives With Other (comment)  ([facility])   Type of Home Assisted living   Discharge Planning   Potential Assistance Needed Skilled Nursing Facility   Patient expects to be discharged to: Skilled nursing facility   Services At/After Discharge   Mode of Transport at Discharge BLS   Confirm Follow Up Transport Family   Condition of Participation: Discharge Planning   The Patient and/or Patient Representative was provided with a Choice of Provider? Patient Representative   Name of the Patient Representative who was provided with the Choice of Provider and agrees with the Discharge Plan?  Daughter Falguni Hernandez   The Patient and/Or Patient Representative agree with the Discharge Plan? Yes   Freedom of Choice list was provided with basic dialogue that supports the patient's individualized plan of care/goals, treatment preferences, and shares the quality data associated with the providers?  Yes

## 2024-07-02 NOTE — DISCHARGE SUMMARY
Hospitalist Discharge Summary   Admit Date:  2024 12:49 PM   DC Note date: 2024  Name:  Jah Verdugo   Age:  84 y.o.  Sex:  male  :  1940   MRN:  [id number]   Room:  [facility]  PCP:  No primary care provider on file.    Presenting Complaint: Fall     Initial Admission Diagnosis: Closed fracture of neck of right femur, initial encounter ([facility]) [S72.001A]  Closed head injury, initial encounter [S09.90XA]     Problem List for this Hospitalization (present on admission):    Principal Problem:    Closed fracture of neck of right femur, initial encounter ([facility])  Active Problems:    Alzheimer disease ([facility])    Closed right hip fracture, initial encounter ([facility])    UTI (urinary tract infection)    Urinary retention    Thrombocytosis    Dysphagia  Resolved Problems:    * No resolved hospital problems. *      Hospital Course:  Jah Verdugo is a 84 y.o. male with medical history of Alzheimer's dementia who presented after an unwitnessed fall last night. Patient is a resident at the [facility]. Patient was unfortunately found down in his room early this morning. X-ray of patient's femur showed right acute impacted subcapital femoral neck fracture. ED provider contacted orthopedic surgery who asked hospitalist to admit for potential intervention following day. CT head without any acute intracranial abnormality. CT spine with no C-spine fracture. Patient's status post fentanyl and IV hydration in the emergency department. WBC of 19.9, CK of 88, platelet count of 942. Status post open treatment of right femoral neck fracture with plate and screw fixation 2024. Post op picture complicated by UTI and urinary retention. Completed course of rocephin for UTI and failed voiding trial on .  Discharging to SNF.  Will need to follow up with primary care, ortho, and urology.         Disposition: SNF Rehab.    Patient has no signs or symptoms of active tuberculosis.  Diet: ADULT DIET; Dysphagia - Pureed  Code Status:

## 2024-07-03 ENCOUNTER — CARE COORDINATION (OUTPATIENT)
Dept: CARE COORDINATION | Facility: CLINIC | Age: 84
End: 2024-07-03

## 2024-07-03 NOTE — CARE COORDINATION
Transition of care outreach postponed for 7 days due to patient's discharge to [facility]  for STR.

## 2024-07-07 ENCOUNTER — HOSPITAL ENCOUNTER (EMERGENCY)
Age: 84
Discharge: HOME | End: 2024-07-07
Attending: EMERGENCY MEDICINE
Payer: MEDICARE

## 2024-07-07 ENCOUNTER — APPOINTMENT (OUTPATIENT)
Dept: CT IMAGING | Age: 84
End: 2024-07-07
Payer: MEDICARE

## 2024-07-07 VITALS
BODY MASS INDEX: 21.66 KG/M2 | TEMPERATURE: 97.7 F | HEIGHT: 65 IN | OXYGEN SATURATION: 95 % | WEIGHT: 130 LBS | SYSTOLIC BLOOD PRESSURE: 134 MMHG | RESPIRATION RATE: 18 BRPM | DIASTOLIC BLOOD PRESSURE: 74 MMHG | HEART RATE: 70 BPM

## 2024-07-07 DIAGNOSIS — D72.829 LEUKOCYTOSIS, UNSPECIFIED TYPE: ICD-10-CM

## 2024-07-07 DIAGNOSIS — R31.9 TRAUMATIC HEMATURIA: Primary | ICD-10-CM

## 2024-07-07 DIAGNOSIS — D75.839 THROMBOCYTOSIS: ICD-10-CM

## 2024-07-07 LAB
ANION GAP SERPL CALC-SCNC: 10 MMOL/L (ref 9–18)
APPEARANCE UR: ABNORMAL
BACTERIA URNS QL MICRO: ABNORMAL /HPF
BASOPHILS # BLD: 0.1 K/UL (ref 0–0.2)
BASOPHILS NFR BLD: 1 % (ref 0–2)
BILIRUB UR QL: ABNORMAL
BUN SERPL-MCNC: 23 MG/DL (ref 8–23)
CALCIUM SERPL-MCNC: 9.5 MG/DL (ref 8.8–10.2)
CHLORIDE SERPL-SCNC: 104 MMOL/L (ref 98–107)
CO2 SERPL-SCNC: 27 MMOL/L (ref 20–28)
COLOR UR: ABNORMAL
CREAT SERPL-MCNC: 1.2 MG/DL (ref 0.8–1.3)
DIFFERENTIAL METHOD BLD: ABNORMAL
EOSINOPHIL # BLD: 0.6 K/UL (ref 0–0.8)
EOSINOPHIL NFR BLD: 4 % (ref 0.5–7.8)
ERYTHROCYTE [DISTWIDTH] IN BLOOD BY AUTOMATED COUNT: 14.9 % (ref 11.9–14.6)
GLUCOSE SERPL-MCNC: 105 MG/DL (ref 70–99)
GLUCOSE UR STRIP.AUTO-MCNC: NEGATIVE MG/DL
HCT VFR BLD AUTO: 40.3 % (ref 41.1–50.3)
HGB BLD-MCNC: 12.3 G/DL (ref 13.6–17.2)
HGB UR QL STRIP: ABNORMAL
IMM GRANULOCYTES # BLD AUTO: 0.4 K/UL (ref 0–0.5)
IMM GRANULOCYTES NFR BLD AUTO: 2 % (ref 0–5)
KETONES UR QL STRIP.AUTO: NEGATIVE MG/DL
LEUKOCYTE ESTERASE UR QL STRIP.AUTO: ABNORMAL
LYMPHOCYTES # BLD: 1.9 K/UL (ref 0.5–4.6)
LYMPHOCYTES NFR BLD: 11 % (ref 13–44)
MCH RBC QN AUTO: 26.5 PG (ref 26.1–32.9)
MCHC RBC AUTO-ENTMCNC: 30.5 G/DL (ref 31.4–35)
MCV RBC AUTO: 86.7 FL (ref 82–102)
MONOCYTES # BLD: 0.8 K/UL (ref 0.1–1.3)
MONOCYTES NFR BLD: 5 % (ref 4–12)
NEUTS SEG # BLD: 13.6 K/UL (ref 1.7–8.2)
NEUTS SEG NFR BLD: 78 % (ref 43–78)
NITRITE UR QL STRIP.AUTO: POSITIVE
NRBC # BLD: 0 K/UL (ref 0–0.2)
PH UR STRIP: 6.5 (ref 5–9)
PLATELET # BLD AUTO: 1455 K/UL (ref 150–450)
PMV BLD AUTO: 9.5 FL (ref 9.4–12.3)
POTASSIUM SERPL-SCNC: 4.6 MMOL/L (ref 3.5–5.1)
PROT UR STRIP-MCNC: 300 MG/DL
RBC # BLD AUTO: 4.65 M/UL (ref 4.23–5.6)
RBC #/AREA URNS HPF: >100 /HPF
SODIUM SERPL-SCNC: 141 MMOL/L (ref 136–145)
SP GR UR REFRACTOMETRY: 1.02 (ref 1–1.02)
UROBILINOGEN UR QL STRIP.AUTO: 0.2 EU/DL (ref 0.2–1)
WBC # BLD AUTO: 17.4 K/UL (ref 4.3–11.1)
WBC URNS QL MICRO: ABNORMAL /HPF

## 2024-07-07 PROCEDURE — 99284 EMERGENCY DEPT VISIT MOD MDM: CPT

## 2024-07-07 PROCEDURE — 74176 CT ABD & PELVIS W/O CONTRAST: CPT

## 2024-07-07 PROCEDURE — 6360000002 HC RX W HCPCS: Performed by: EMERGENCY MEDICINE

## 2024-07-07 PROCEDURE — 2580000003 HC RX 258: Performed by: EMERGENCY MEDICINE

## 2024-07-07 PROCEDURE — 80048 BASIC METABOLIC PNL TOTAL CA: CPT

## 2024-07-07 PROCEDURE — 87086 URINE CULTURE/COLONY COUNT: CPT

## 2024-07-07 PROCEDURE — 81001 URINALYSIS AUTO W/SCOPE: CPT

## 2024-07-07 PROCEDURE — 85025 COMPLETE CBC W/AUTO DIFF WBC: CPT

## 2024-07-07 PROCEDURE — 96374 THER/PROPH/DIAG INJ IV PUSH: CPT

## 2024-07-07 RX ORDER — CEFPODOXIME PROXETIL 200 MG/1
200 TABLET, FILM COATED ORAL DAILY
Qty: 10 TABLET | Refills: 0 | Status: SHIPPED | OUTPATIENT
Start: 2024-07-07 | End: 2024-07-17

## 2024-07-07 RX ADMIN — CEFTRIAXONE 1000 MG: 1 INJECTION, POWDER, FOR SOLUTION INTRAMUSCULAR; INTRAVENOUS at 13:50

## 2024-07-07 ASSESSMENT — LIFESTYLE VARIABLES
HOW OFTEN DO YOU HAVE A DRINK CONTAINING ALCOHOL: NEVER
HOW MANY STANDARD DRINKS CONTAINING ALCOHOL DO YOU HAVE ON A TYPICAL DAY: PATIENT DOES NOT DRINK

## 2024-07-07 ASSESSMENT — PAIN - FUNCTIONAL ASSESSMENT
PAIN_FUNCTIONAL_ASSESSMENT: NONE - DENIES PAIN

## 2024-07-07 NOTE — ED NOTES
Per MD prior to discharge changed aguilera to leg drainage bag and changed securement device. Per MD request also RN deflated balloon and advanced aguilera catheter and re inflated.      Jennifer Bass, RN  07/07/24 6537

## 2024-07-07 NOTE — ED PROVIDER NOTES
Emergency Department Provider Note       PCP: No primary care provider on file.   Age: 84 y.o.   Sex: male     DISPOSITION Discharge - Pending Orders Complete 07/07/2024 01:42:52 PM       ICD-10-CM    1. Traumatic hematuria  R31.9       2. Leukocytosis, unspecified type  D72.829       3. Thrombocytosis  D75.839           Medical Decision Making     Laboratory data was reviewed.  The patient has a significant leukocytosis and thrombocytosis.  This has been present for for several weeks.  The thrombocytosis while not as severe and also dates back to 2020 per internal medicine documentation.  The patient is afebrile but has a history of urinary tract infection although I could not find any urine culture results.  Given the recent trauma with the Chatman catheter in elevated white blood cell count will give a dose of antibiotics IV in the emergency department.  The patient does not appear septic.  CT of the abdomen demonstrated the balloon of the Chatman catheter to be within the proximal urethra.  The balloon was subsidy deflated and the Chatman catheter advanced with return of 1400 mL of urine.  Some hematuria and few small clots were noted.  Patient is stable for discharge and will be prescribed antibiotics empirically pending results of the urine culture.     1 acute, uncomplicated illness or injury.  Prescription drug management performed.  Shared medical decision making was utilized in creating the patients health plan today.    I independently ordered and reviewed each unique test.                     History     Sent to the ER for evaluation for hematuria.  Patient has Alzheimer's dementia and is unable to give any history or review of systems.  EMS reports that nursing home staff reported possible trauma secondary to Chatman catheter.  Chatman catheter was present upon presentation with blood in the urine collection bag.  No active bleeding from the urethral meatus.    The history is provided by the nursing home,

## 2024-07-07 NOTE — DISCHARGE SUMMARY
I have reviewed discharge instructions with the facility RN.  The facility RN verbalized understanding.    Patient left ED via Discharge Method: stretcher to [facility] with [facility].    Opportunity for questions and clarification provided.       Patient given 1 scripts.         To continue your aftercare when you leave the hospital, you may receive an automated call from our care team to check in on how you are doing.  This is a free service and part of our promise to provide the best care and service to meet your aftercare needs.” If you have questions, or wish to unsubscribe from this service please call [phone].  Thank you for Choosing our [facility] Emergency Department.

## 2024-07-07 NOTE — ED NOTES
TRANSFER - OUT REPORT:    Verbal report given to [facility] on Jah Verdugo  being transferred to [facility] for routine progression of patient care       Report consisted of patient's Situation, Background, Assessment and   Recommendations(SBAR).     Information from the following report(s) ED SBAR was reviewed with the receiving nurse.    Lines:   Peripheral IV 07/07/24 Distal;Left Forearm (Active)        Opportunity for questions and clarification was provided.      Patient transported with:  Jennifer Brown RN  07/07/24 0844

## 2024-07-07 NOTE — ED TRIAGE NOTES
Patient arrives via EMS from [facility] for hematuria. Has aguilera catheter in place and patient accidentally pulled it while walking.   VSS en route. EMS placed L FA 20 gauge IV and collected labs. Sent to lab on temp labels. No meds given en route.

## 2024-07-10 ENCOUNTER — CARE COORDINATION (OUTPATIENT)
Dept: CARE COORDINATION | Facility: CLINIC | Age: 84
End: 2024-07-10

## 2024-07-10 LAB
BACTERIA SPEC CULT: NORMAL
SERVICE CMNT-IMP: NORMAL

## 2024-07-10 NOTE — CARE COORDINATION
Care Transitions Post-Acute Facility Update Call    7/10/2024    Patient: Jah Verdugo Patient : 1940   MRN: [id number]  Reason for Admission: Closed fracture of neck of right femur)  Discharge Date: 24 RARS: Readmission Risk Score: 10.8         Care Transitions Post Acute Facility Update    Care Transitions Interventions      Post Acute Facility Update     Outreach to [facility] [phone]. Spoke with RN at [facility] who reports patient remains in their care.  Left voicemail for Melissa THOMASON requesting d/c plan update.    Will continue to outreach per protocol.

## 2024-07-11 ENCOUNTER — OFFICE VISIT (OUTPATIENT)
Dept: ORTHOPEDIC SURGERY | Age: 84
End: 2024-07-11

## 2024-07-11 DIAGNOSIS — S72.001A CLOSED FRACTURE OF NECK OF RIGHT FEMUR, INITIAL ENCOUNTER (HCC): Primary | ICD-10-CM

## 2024-07-11 PROCEDURE — 99024 POSTOP FOLLOW-UP VISIT: CPT | Performed by: ORTHOPAEDIC SURGERY

## 2024-07-11 NOTE — PROGRESS NOTES
Progress Note    Patient: Jah Verdugo MRN: [id number]  SSN: [id number]    YOB: 1940  Age: 84 y.o.  Sex: male        7/11/2024      Subjective:     Patient is here today in follow-up.  Is about 2 weeks out from femoral neck system fixation of a right femoral neck fracture.  He seems that he is doing well    Objective:     There were no vitals filed for this visit.       Physical Exam:     Skin - incision is well healed with no redness or drainage  Motor and sensory function intact in RIGHT LOWER extremity  Pulses palpable in RIGHT LOWER extremity     XRAY FINDINGS:  No new x-rays    Assessment:     2 weeks out from femoral neck system fixation of neck fracture    Plan:     We are going to remove his staples today.  He can be full active and passive range of motion of his right hip and full strength of his right hip.  He can be weightbearing as tolerated.  I will see him back in about 4 weeks with AP and lateral right hip on return he will be about 6 weeks out at that time    Signed By: Lincoln Melendrez MD     July 11, 2024

## 2024-07-16 ENCOUNTER — CARE COORDINATION (OUTPATIENT)
Dept: CARE COORDINATION | Facility: CLINIC | Age: 84
End: 2024-07-16

## 2024-07-16 NOTE — CARE COORDINATION
Care Transitions Post-Acute Facility Update Call    2024    Patient: Jah Verdugo Patient : 1940   MRN: [id number]  Reason for Admission: Closed fracture of neck of right femur  Discharge Date: 24 RARS: Readmission Risk Score: 10.8         Care Transitions Post Acute Facility Update    Care Transitions Interventions      Post Acute Facility Update   Outreach to [facility] [phone]. Spoke with RN at [facility] who reports patient remains in their care.  Left voicemail for Melissa THOMASON requesting d/c plan update.    Will continue to outreach per protocol.

## 2024-07-23 ENCOUNTER — CARE COORDINATION (OUTPATIENT)
Dept: CARE COORDINATION | Facility: CLINIC | Age: 84
End: 2024-07-23

## 2024-07-23 NOTE — CARE COORDINATION
Care Transitions Post-Acute Facility Update Call    2024    Patient: Jah Verdugo Patient : 1940   MRN: [id number]  Reason for Admission: Closed fracture of neck of right femur   Discharge Date: 24 RARS: Readmission Risk Score: 10.8         Care Transitions Post Acute Facility Update    Care Transitions Interventions      Post Acute Facility Update     Outreach to [facility].  M for , Chris Brooks requesting update regarding any d/c plans.  Will continue to outreach per protocol.

## 2024-07-25 ENCOUNTER — CARE COORDINATION (OUTPATIENT)
Dept: CARE COORDINATION | Facility: CLINIC | Age: 84
End: 2024-07-25

## 2024-07-25 NOTE — CARE COORDINATION
Care Transitions Note    Initial Call - Call within 2 business days of discharge: Yes    Attempted to reach patient for transitions of care follow up. Unable to reach patient.    Outreach Attempts:   HIPAA compliant voicemail left for patient.     Patient: Jah Verdugo    Patient : 1940   MRN: [id number]    Reason for Admission: Closed fracture of neck of right femur   Discharge Date: 24  RURS: Readmission Risk Score: 10.8    Last Discharge Facility       Date Complaint Diagnosis Description Type Department Provider    24 Hematuria Traumatic hematuria ... ED (DISCHARGE) SFHussain Rodríguez, DO            Was this an external facility discharge? Yes. Discharge Date: 2024. Facility Name: [facility]  Discharged back to [facility] on 2024.   Follow Up Appointment:   Patient has hospital follow up appointment scheduled  Attended initial Ortho follow up on 2024.     Future Appointments         Provider Specialty Dept Phone    2024 9:45 AM Lincoln Melendrez MD Orthopedic Surgery [phone]            Plan for follow-up on next business day.      Fariha Meza RN

## 2024-07-26 ENCOUNTER — CARE COORDINATION (OUTPATIENT)
Dept: CARE COORDINATION | Facility: CLINIC | Age: 84
End: 2024-07-26

## 2024-07-26 NOTE — CARE COORDINATION
Care Transitions Note    Initial Call - Call within 2 business days of discharge: Yes    Attempted to reach patient for transitions of care follow up. Unable to reach patient.    Outreach Attempts:   Multiple attempts to contact patient at phone numbers on file.   HIPAA compliant voicemail left for patient.   [employer] message sent.     Patient: Jah Verdugo    Patient : 1940   MRN: [id number]    Reason for Admission: Closed fracture of neck of right femur   Discharge Date: 24  RURS: Readmission Risk Score: 10.8    Last Discharge Facility       Date Complaint Diagnosis Description Type Department Provider    24 Hematuria Traumatic hematuria ... ED (DISCHARGE) [facility] Hussain Cash, DO            Was this an external facility discharge? Yes. Discharge Date: 2024. Facility Name: [facility]    Follow Up Appointment:   Patient has hospital follow up appointment scheduled Patient has hospital follow up appointment scheduled  Attended initial Ortho follow up on 2024.           No further follow-up call indicated     Fariha Meza RN

## 2024-07-31 PROBLEM — N39.0 UTI (URINARY TRACT INFECTION): Status: RESOLVED | Noted: 2024-07-01 | Resolved: 2024-07-31

## 2025-02-15 ENCOUNTER — HOSPITAL ENCOUNTER (EMERGENCY)
Age: 85
Discharge: NURSING, REHAB OR LONG-TERM CARE | End: 2025-02-15
Attending: EMERGENCY MEDICINE
Payer: MEDICARE

## 2025-02-15 ENCOUNTER — APPOINTMENT (OUTPATIENT)
Dept: CT IMAGING | Age: 85
End: 2025-02-15
Payer: MEDICARE

## 2025-02-15 VITALS
RESPIRATION RATE: 18 BRPM | WEIGHT: 127.9 LBS | BODY MASS INDEX: 21.31 KG/M2 | SYSTOLIC BLOOD PRESSURE: 117 MMHG | OXYGEN SATURATION: 93 % | TEMPERATURE: 97.6 F | DIASTOLIC BLOOD PRESSURE: 76 MMHG | HEIGHT: 65 IN | HEART RATE: 73 BPM

## 2025-02-15 DIAGNOSIS — S09.90XA INJURY OF HEAD, INITIAL ENCOUNTER: Primary | ICD-10-CM

## 2025-02-15 DIAGNOSIS — S01.01XA LACERATION OF SCALP, INITIAL ENCOUNTER: ICD-10-CM

## 2025-02-15 PROCEDURE — 99284 EMERGENCY DEPT VISIT MOD MDM: CPT

## 2025-02-15 PROCEDURE — 70450 CT HEAD/BRAIN W/O DYE: CPT

## 2025-02-15 PROCEDURE — 72125 CT NECK SPINE W/O DYE: CPT

## 2025-02-15 PROCEDURE — 12001 RPR S/N/AX/GEN/TRNK 2.5CM/<: CPT

## 2025-02-15 ASSESSMENT — PAIN SCALES - PAIN ASSESSMENT IN ADVANCED DEMENTIA (PAINAD)
CONSOLABILITY: NO NEED TO CONSOLE
FACIALEXPRESSION: FACIAL GRIMACING
BREATHING: NORMAL
TOTALSCORE: 2
BODYLANGUAGE: RELAXED

## 2025-02-15 ASSESSMENT — PAIN - FUNCTIONAL ASSESSMENT: PAIN_FUNCTIONAL_ASSESSMENT: PAIN ASSESSMENT IN ADVANCED DEMENTIA (PAINAD)

## 2025-02-15 NOTE — ED PROVIDER NOTES
Emergency Department Provider Note       PCP: No primary care provider on file.   Age: 84 y.o.   Sex: male     DISPOSITION Decision To Discharge 02/15/2025 07:51:09 PM    ICD-10-CM    1. Injury of head, initial encounter  S09.90XA       2. Laceration of scalp, initial encounter  S01.01XA           Medical Decision Making     Patient is 84-year-old male from [facility] who had a mechanical fall unwitnessed striking right head on a corner of a wall.  Unknown LOC.  Patient's bleeding controlled at the scene.  Patient does have a history of Alzheimer and ANO x 4 and is at baseline.  Patient is on hospice.         1 or more acute illnesses that pose a threat to life or bodily function.   Shared medical decision making was utilized in creating the patients health plan today.  I independently ordered and reviewed each unique test.    Patient is 84-year-old male from [facility] who had a mechanical fall unwitnessed striking right head on a corner of a wall.  Unknown LOC.  Patient's bleeding controlled at the scene.  Patient does have a history of Alzheimer and ANO x 4 and is at baseline.  Patient is on hospice.     Differential diagnosis includes but is not limited to intracranial hemorrhage, scalp contusion, skull fracture    Patient's physical exam is as stated.    My independent interpretation of patient CT head and CT cervical spine negative for acute abnormality.    Patient is 1 cm of right parietal scalp laceration was repaired with Dermabond no complications.  Patient is up-to-date with his tetanus according to daughter at bedside.  We will DC and have patient follow-up and return for worsening or changing symptoms.  The patients assessment required an independent historian: Daughter.  The reason they were needed is important historical information not provided by the patient.      I interpreted the CT Scan head and cspine NAD.              History     Patient is 84-year-old male from memory

## 2025-02-15 NOTE — ED TRIAGE NOTES
Patient arrives via [facility] from the [facility]. Reports mechanical fall hit head on corner of wall. Unknown LOC. Bleeding controlled on arrival. Ccollar placed by EMS. Pt a/o x 1 which is baseline hx alzheimer's. On hospice.

## 2025-02-16 NOTE — ED NOTES
TRANSFER - OUT REPORT:    Verbal report given to Sultana Grider RN on Jah Verdugo  being transferred to [facility] for routine progression of patient care       Report consisted of patient's Situation, Background, Assessment and   Recommendations(SBAR).     Information from the following report(s) Nurse Handoff Report was reviewed with the receiving nurse.    Mendy Fall Assessment:    Presents to emergency department  because of falls (Syncope, seizure, or loss of consciousness): Yes  Age > 70: Yes  Altered Mental Status, Intoxication with alcohol or substance confusion (Disorientation, impaired judgment, poor safety awaremess, or inability to follow instructions): Yes  Impaired Mobility: Ambulates or transfers with assistive devices or assistance; Unable to ambulate or transer.: Yes             Lines:       Opportunity for questions and clarification was provided.      Patient transported with:  Chang Uribe RN  02/15/25 [id number]

## (undated) DEVICE — SUTURE MONOCRYL SZ 2-0 L27IN ABSRB UD SH L26MM TAPERPOINT NDL Y417H

## (undated) DEVICE — C-ARM: Brand: UNBRANDED

## (undated) DEVICE — 7 DAY SILVER-COATED ANTIMICROBIAL BARRIER DRESSING: Brand: ACTICOAT 7  4" X 5"

## (undated) DEVICE — DRAPE,U/SHT,SPLIT,FILM,60X84,STERILE: Brand: MEDLINE

## (undated) DEVICE — SHEET,DRAPE,53X77,STERILE: Brand: MEDLINE

## (undated) DEVICE — INTENDED FOR TISSUE SEPARATION, AND OTHER PROCEDURES THAT REQUIRE A SHARP SURGICAL BLADE TO PUNCTURE OR CUT.: Brand: BARD-PARKER ® STAINLESS STEEL BLADES

## (undated) DEVICE — GARMENT,MEDLINE,DVT,INT,CALF,MED, GEN2: Brand: MEDLINE

## (undated) DEVICE — KIT ARMOR C DRP COLLAPSIBLE AND SELF EXP TOP CVR FOR FLUOROSCOPIC

## (undated) DEVICE — SHEET, DRAPE, SPLIT, STERILE: Brand: MEDLINE

## (undated) DEVICE — PAD,ABDOMINAL,5"X9",ST,LF,25/BX: Brand: MEDLINE INDUSTRIES, INC.

## (undated) DEVICE — LIMB HOLDER, WRIST/ANKLE: Brand: DEROYAL

## (undated) DEVICE — BANDAGE COBAN 4 IN COMPR W4INXL5YD FOAM COHESIVE QUIK STK SELF ADH SFT

## (undated) DEVICE — SPONGE GZ W4XL4IN COT 12 PLY TYP VII WVN C FLD DSGN STERILE

## (undated) DEVICE — DRAPE PT ISOLATN 130 IN X 96 IN

## (undated) DEVICE — PACK PROCEDURE SURG HIP PIN TROCHANTERIC FEM NAIL CDS

## (undated) DEVICE — GLOVE ORANGE PI 7 1/2   MSG9075

## (undated) DEVICE — GUIDEWIRE ORTH L400MM DIA3.2MM FOR TFN

## (undated) DEVICE — STOCKINET,IMPERVIOUS,6X30: Brand: MEDLINE

## (undated) DEVICE — BIT DRL L413MM DIA4.3MM FOR FEM NK SYSTEN

## (undated) DEVICE — GLOVE SURG SZ 8 L12IN FNGR THK79MIL GRN LTX FREE

## (undated) DEVICE — DRILL, AO, STERILE

## (undated) DEVICE — REAMER SHAFT, MOD.TRINKLE: Brand: BIXCUT

## (undated) DEVICE — K-WIRE